# Patient Record
Sex: FEMALE | Race: WHITE | NOT HISPANIC OR LATINO | Employment: FULL TIME | URBAN - METROPOLITAN AREA
[De-identification: names, ages, dates, MRNs, and addresses within clinical notes are randomized per-mention and may not be internally consistent; named-entity substitution may affect disease eponyms.]

---

## 2018-01-03 ENCOUNTER — TELEPHONE (OUTPATIENT)
Dept: GASTROENTEROLOGY | Facility: CLINIC | Age: 43
End: 2018-01-03

## 2018-01-03 NOTE — TELEPHONE ENCOUNTER
----- Message from Rivera Ibarra sent at 1/3/2018  2:26 PM EST -----  Regarding: PT CALLED  Contact: [phone]   PT IS CALLING TO SEE IF SHE CAN GET A REFILL ON sulfaSALAzine (AZULFIDINE) 500 MG tablet , SAYS HER BOWELS ARE ALL FLARED UP & THIS MED REALLY HELPED.    PT WOULD LIKE A CALL BACK AS WELL.  PHARM#[phone]

## 2018-01-04 ENCOUNTER — OFFICE VISIT (OUTPATIENT)
Dept: GASTROENTEROLOGY | Facility: CLINIC | Age: 43
End: 2018-01-04

## 2018-01-04 VITALS
HEIGHT: 59 IN | DIASTOLIC BLOOD PRESSURE: 70 MMHG | TEMPERATURE: 98.6 F | BODY MASS INDEX: 35.56 KG/M2 | SYSTOLIC BLOOD PRESSURE: 122 MMHG | WEIGHT: 176.4 LBS

## 2018-01-04 DIAGNOSIS — K51.011 ULCERATIVE PANCOLITIS WITH RECTAL BLEEDING (HCC): ICD-10-CM

## 2018-01-04 DIAGNOSIS — K62.5 RECTAL BLEEDING: ICD-10-CM

## 2018-01-04 DIAGNOSIS — R19.7 DIARRHEA, UNSPECIFIED TYPE: Primary | ICD-10-CM

## 2018-01-04 PROCEDURE — 99214 OFFICE O/P EST MOD 30 MIN: CPT | Performed by: INTERNAL MEDICINE

## 2018-01-04 RX ORDER — CETIRIZINE HYDROCHLORIDE 10 MG/1
10 TABLET ORAL DAILY
COMMUNITY
End: 2020-10-30

## 2018-01-04 RX ORDER — SULFASALAZINE 500 MG/1
TABLET ORAL
Qty: 180 TABLET | Refills: 11 | Status: SHIPPED | OUTPATIENT
Start: 2018-01-04 | End: 2018-03-12 | Stop reason: SDUPTHER

## 2018-01-04 RX ORDER — VENLAFAXINE HYDROCHLORIDE 75 MG/1
CAPSULE, EXTENDED RELEASE ORAL
Refills: 0 | COMMUNITY
Start: 2017-12-29 | End: 2018-09-10 | Stop reason: SINTOL

## 2018-01-04 RX ORDER — SPIRONOLACTONE 25 MG/1
TABLET ORAL
Refills: 0 | COMMUNITY
Start: 2017-12-29

## 2018-01-04 NOTE — PROGRESS NOTES
Chief Complaint   Patient presents with   • Abdominal Pain   • Rectal Bleeding   • Ulcerative Colitis       History of Present Illness: 43 yo female  with a h/o ulcerative colitis diagnosed in . She is having rectal bleeding, mucous, diarrhea: she could have 10-50 BM/day. She has been flairing for 4 prachi months. One 10 yo son. She has mild lower abdominal pain that is worse prior to BM and better after BM. No nausea or vomting. NO fevers, chills. Weight stable. She is taking nothing for UC but the Sulfasalazine worked in the past. No foreign travel. Last antibiotics: long time.     Past Medical History:   Diagnosis Date   • Ulcerative colitis        Past Surgical History:   Procedure Laterality Date   • BREAST AUGMENTATION     •  SECTION     • COLONOSCOPY  2015    left sided ulcerative colitis   • UPPER GASTROINTESTINAL ENDOSCOPY  2015    gastritis   • WISDOM TOOTH EXTRACTION           Current Outpatient Prescriptions:   •  cetirizine (zyrTEC) 10 MG tablet, Take 10 mg by mouth Daily., Disp: , Rfl:   •  Prenatal Vit-Fe Fumarate-FA (PRENATAL VITAMIN PO), Take  by mouth., Disp: , Rfl:   •  PROBIOTIC PRODUCT PO, Take  by mouth., Disp: , Rfl:   •  spironolactone (ALDACTONE) 25 MG tablet, TK 1 TO 2 TS PO D, Disp: , Rfl: 0  •  venlafaxine XR (EFFEXOR-XR) 75 MG 24 hr capsule, TK 1 C PO QAM, Disp: , Rfl: 0  •  sulfaSALAzine (AZULFIDINE) 500 MG tablet, Take 3 tablets by mouth twice a day, Disp: 180 tablet, Rfl: 11    No Known Allergies    Family History   Problem Relation Age of Onset   • Pancreatic cancer Maternal Grandmother        Social History     Social History   • Marital status:      Spouse name: N/A   • Number of children: N/A   • Years of education: N/A     Occupational History   • Not on file.     Social History Main Topics   • Smoking status: Never Smoker   • Smokeless tobacco: Never Used   • Alcohol use Yes      Comment: seldom   • Drug use: No   • Sexual activity: Not on file      Other Topics Concern   • Not on file     Social History Narrative   • No narrative on file       Review of Systems   Gastrointestinal: Positive for anal bleeding and diarrhea.   All other systems reviewed and are negative.      Vitals:    01/04/18 1028   BP: 122/70   Temp: 98.6 °F (37 °C)       Physical Exam   Constitutional: She is oriented to person, place, and time. She appears well-developed and well-nourished. No distress.   HENT:   Head: Normocephalic and atraumatic. Hair is normal.   Right Ear: Hearing, tympanic membrane, external ear and ear canal normal. No drainage. No decreased hearing is noted.   Left Ear: Hearing, tympanic membrane, external ear and ear canal normal. No decreased hearing is noted.   Nose: No nasal deformity.   Mouth/Throat: Oropharynx is clear and moist.   Eyes: Conjunctivae, EOM and lids are normal. Pupils are equal, round, and reactive to light. Right eye exhibits no discharge. Left eye exhibits no discharge.   Neck: Normal range of motion. Neck supple. No JVD present. No tracheal deviation present. No thyromegaly present.   Cardiovascular: Normal rate, regular rhythm, normal heart sounds, intact distal pulses and normal pulses.  Exam reveals no gallop and no friction rub.    No murmur heard.  Pulmonary/Chest: Effort normal and breath sounds normal. No respiratory distress. She has no wheezes. She has no rales. She exhibits no tenderness.   Abdominal: Soft. Bowel sounds are normal. She exhibits no distension and no mass. There is no tenderness. There is no rebound and no guarding. No hernia.   Genitourinary: Rectal exam shows guaiac positive stool.   Genitourinary Comments: Normal anorectal exam.   Musculoskeletal: Normal range of motion. She exhibits no edema, tenderness or deformity.   Lymphadenopathy:     She has no cervical adenopathy.   Neurological: She is alert and oriented to person, place, and time. She has normal reflexes. She displays normal reflexes. No cranial nerve  deficit. She exhibits normal muscle tone. Coordination normal.   Skin: Skin is warm and dry. No rash noted. She is not diaphoretic. No erythema.   Psychiatric: She has a normal mood and affect. Her behavior is normal. Judgment and thought content normal.   Vitals reviewed.      Marah was seen today for abdominal pain, rectal bleeding and ulcerative colitis.    Diagnoses and all orders for this visit:    Diarrhea, unspecified type  -     C-reactive Protein  -     Sedimentation Rate  -     Calprotectin, Fecal - Stool, Per Rectum  -     CBC & Differential  -     Comprehensive Metabolic Panel  -     Clostridium Difficile EIA - Stool, Per Rectum  -     Fecal Leukocytes - Stool, Per Rectum  -     Giardia Antigen - Stool, Per Rectum  -     Ova & Parasite Examination - Stool, Per Rectum  -     Stool Culture - Stool, Per Rectum  -     Celiac Ab tTG DGP TIgA  -     TSH    Rectal bleeding  -     C-reactive Protein  -     Sedimentation Rate  -     Calprotectin, Fecal - Stool, Per Rectum  -     CBC & Differential  -     Comprehensive Metabolic Panel  -     Clostridium Difficile EIA - Stool, Per Rectum  -     Fecal Leukocytes - Stool, Per Rectum  -     Giardia Antigen - Stool, Per Rectum  -     Ova & Parasite Examination - Stool, Per Rectum  -     Stool Culture - Stool, Per Rectum  -     TSH    Ulcerative pancolitis with rectal bleeding  -     C-reactive Protein  -     Sedimentation Rate  -     Calprotectin, Fecal - Stool, Per Rectum  -     CBC & Differential  -     Comprehensive Metabolic Panel  -     Clostridium Difficile EIA - Stool, Per Rectum  -     Fecal Leukocytes - Stool, Per Rectum  -     Giardia Antigen - Stool, Per Rectum  -     Ova & Parasite Examination - Stool, Per Rectum  -     Stool Culture - Stool, Per Rectum  -     sulfaSALAzine (AZULFIDINE) 500 MG tablet; Take 3 tablets by mouth twice a day  -     TSH      Assessment:  1) h/o ulcerative colitis diagnosed in 2009.  2) Bloody diarrhea with heme positive stool on  rectal exam today.    Recommendations:  1) Stool studies  2) Labs:  3) Sulfasalazine 3 BID.  4) f/u 4 weeks.     No Follow-up on file.    David Capellan MD  1/4/2018

## 2018-01-04 NOTE — TELEPHONE ENCOUNTER
Called pt and pt reports that she is currently having very bloody and mucusy stools with severe urgency.  She would like to be seen as soon as she can.  Offered pt an appt today at 10 am with Dr Capellan.  Pt verb understanding and can make the appt.

## 2018-01-05 LAB
ALBUMIN SERPL-MCNC: 4.2 G/DL (ref 3.5–5.2)
ALBUMIN/GLOB SERPL: 1.2 G/DL
ALP SERPL-CCNC: 105 U/L (ref 39–117)
ALT SERPL-CCNC: 21 U/L (ref 1–33)
AST SERPL-CCNC: 19 U/L (ref 1–32)
BASOPHILS # BLD AUTO: 0.02 10*3/MM3 (ref 0–0.2)
BASOPHILS NFR BLD AUTO: 0.2 % (ref 0–1.5)
BILIRUB SERPL-MCNC: 0.4 MG/DL (ref 0.1–1.2)
BUN SERPL-MCNC: 15 MG/DL (ref 6–20)
BUN/CREAT SERPL: 20 (ref 7–25)
CALCIUM SERPL-MCNC: 9.3 MG/DL (ref 8.6–10.5)
CHLORIDE SERPL-SCNC: 101 MMOL/L (ref 98–107)
CO2 SERPL-SCNC: 25.3 MMOL/L (ref 22–29)
CREAT SERPL-MCNC: 0.75 MG/DL (ref 0.57–1)
CRP SERPL-MCNC: 0.66 MG/DL (ref 0–0.5)
EOSINOPHIL # BLD AUTO: 0.12 10*3/MM3 (ref 0–0.7)
EOSINOPHIL NFR BLD AUTO: 1.3 % (ref 0.3–6.2)
ERYTHROCYTE [DISTWIDTH] IN BLOOD BY AUTOMATED COUNT: 13.2 % (ref 11.7–13)
ERYTHROCYTE [SEDIMENTATION RATE] IN BLOOD BY WESTERGREN METHOD: 16 MM/HR (ref 0–20)
GLIADIN PEPTIDE IGA SER-ACNC: 4 UNITS (ref 0–19)
GLIADIN PEPTIDE IGG SER-ACNC: 7 UNITS (ref 0–19)
GLOBULIN SER CALC-MCNC: 3.4 GM/DL
GLUCOSE SERPL-MCNC: 89 MG/DL (ref 65–99)
HCT VFR BLD AUTO: 42.9 % (ref 35.6–45.5)
HGB BLD-MCNC: 13.3 G/DL (ref 11.9–15.5)
IGA SERPL-MCNC: 293 MG/DL (ref 87–352)
IMM GRANULOCYTES # BLD: 0.03 10*3/MM3 (ref 0–0.03)
IMM GRANULOCYTES NFR BLD: 0.3 % (ref 0–0.5)
LYMPHOCYTES # BLD AUTO: 3.08 10*3/MM3 (ref 0.9–4.8)
LYMPHOCYTES NFR BLD AUTO: 32.3 % (ref 19.6–45.3)
MCH RBC QN AUTO: 29.6 PG (ref 26.9–32)
MCHC RBC AUTO-ENTMCNC: 31 G/DL (ref 32.4–36.3)
MCV RBC AUTO: 95.3 FL (ref 80.5–98.2)
MONOCYTES # BLD AUTO: 0.66 10*3/MM3 (ref 0.2–1.2)
MONOCYTES NFR BLD AUTO: 6.9 % (ref 5–12)
NEUTROPHILS # BLD AUTO: 5.62 10*3/MM3 (ref 1.9–8.1)
NEUTROPHILS NFR BLD AUTO: 59 % (ref 42.7–76)
PLATELET # BLD AUTO: 374 10*3/MM3 (ref 140–500)
POTASSIUM SERPL-SCNC: 4.8 MMOL/L (ref 3.5–5.2)
PROT SERPL-MCNC: 7.6 G/DL (ref 6–8.5)
RBC # BLD AUTO: 4.5 10*6/MM3 (ref 3.9–5.2)
SODIUM SERPL-SCNC: 138 MMOL/L (ref 136–145)
TSH SERPL DL<=0.005 MIU/L-ACNC: 1.78 MIU/ML (ref 0.27–4.2)
TTG IGA SER-ACNC: <2 U/ML (ref 0–3)
TTG IGG SER-ACNC: <2 U/ML (ref 0–5)
WBC # BLD AUTO: 9.53 10*3/MM3 (ref 4.5–10.7)

## 2018-01-07 NOTE — PROGRESS NOTES
MOst of her labs look good. Her CBC is normal. Celiac sprue antibody panel, CMP and thyroid stimulating hormone all normal.   I will be curious what the stool studies show.

## 2018-01-08 ENCOUNTER — TELEPHONE (OUTPATIENT)
Dept: GASTROENTEROLOGY | Facility: CLINIC | Age: 43
End: 2018-01-08

## 2018-01-08 NOTE — TELEPHONE ENCOUNTER
----- Message from David Capellan MD sent at 1/7/2018  3:22 PM EST -----  MOst of her labs look good. Her CBC is normal. Celiac sprue antibody panel, CMP and thyroid stimulating hormone all normal.   I will be curious what the stool studies show.

## 2018-01-08 NOTE — TELEPHONE ENCOUNTER
Call from pt.  Advise per Dr Masterson that most of labs look good.  CBC, celiac panel, CMP, and TSH all normal.  Dr Capellan will be curious what the stool studies show.  Pt verb understanding and states is bring stool specimens to office now.

## 2018-01-15 ENCOUNTER — OFFICE VISIT (OUTPATIENT)
Dept: OBSTETRICS AND GYNECOLOGY | Facility: CLINIC | Age: 43
End: 2018-01-15

## 2018-01-15 VITALS
BODY MASS INDEX: 40.73 KG/M2 | SYSTOLIC BLOOD PRESSURE: 104 MMHG | WEIGHT: 176 LBS | HEIGHT: 55 IN | DIASTOLIC BLOOD PRESSURE: 62 MMHG

## 2018-01-15 DIAGNOSIS — Z01.419 WELL WOMAN EXAM WITH ROUTINE GYNECOLOGICAL EXAM: Primary | ICD-10-CM

## 2018-01-15 PROCEDURE — 99386 PREV VISIT NEW AGE 40-64: CPT | Performed by: NURSE PRACTITIONER

## 2018-01-15 NOTE — PROGRESS NOTES
[facility]  Routine Annual Visit    1/15/2018    Patient: Marah Britt          MR#:[id number]      History of Present Illness    42 y.o. female  who presents to [facility] care and for annual exam. She has never had a mammogram. She will have her first today. She reports last pap 3-4 years ago. She denies any hx of abnormals. She has had 1 pregnancy and 1 delivery via  section in  by Dr. Dickson. Marah reports normal, monthly cycles every 28 days lasting about 4 days with light bleeding and mild cramping. Medical hx significant for UC- managed by Dr. Capellan. She has not been sexually active since becoming a . She has no plans to have IC and declines birth control. Marah sees primary care and is currently working on weight reduction. Her 9 year old son is doing very well in school and extracurricular activities. Marah has no complaints today.     Patient's last menstrual period was 2018.  Obstetric History:  OB History      Para Term  AB Living    1         SAB TAB Ectopic Multiple Live Births        1         Menstrual History:     Patient's last menstrual period was 2018.       Sexual History:       ________________________________________  There is no problem list on file for this patient.      Past Medical History:   Diagnosis Date   • Ulcerative colitis        Past Surgical History:   Procedure Laterality Date   • BREAST AUGMENTATION     •  SECTION     • COLONOSCOPY  2015    left sided ulcerative colitis   • UPPER GASTROINTESTINAL ENDOSCOPY  2015    gastritis   • WISDOM TOOTH EXTRACTION         History   Smoking Status   • Never Smoker   Smokeless Tobacco   • Never Used       Family History   Problem Relation Age of Onset   • Pancreatic cancer Maternal Grandmother        Prior to Admission medications    Medication Sig Start Date End Date Taking? Authorizing Provider   cetirizine (zyrTEC) 10 MG tablet Take 10 mg by mouth Daily.    Yes Historical Provider, MD   Prenatal Vit-Fe Fumarate-FA (PRENATAL VITAMIN PO) Take  by mouth.   Yes Historical Provider, MD   PROBIOTIC PRODUCT PO Take  by mouth.   Yes Historical Provider, MD   spironolactone (ALDACTONE) 25 MG tablet TK 1 TO 2 TS PO D 12/29/17  Yes Historical Provider, MD   sulfaSALAzine (AZULFIDINE) 500 MG tablet Take 3 tablets by mouth twice a day 1/4/18  Yes David Capellan MD   venlafaxine XR (EFFEXOR-XR) 75 MG 24 hr capsule TK 1 C PO QAM 12/29/17  Yes Historical Provider, MD     ________________________________________    Current contraception: abstinence  History of abnormal Pap smear: no  Family history of uterine or ovarian cancer: no  Family History of colon cancer/colon polyps: no  History of abnormal mammogram: no  History of abnormal lipids: no    The following portions of the patient's history were reviewed and updated as appropriate: allergies, current medications, past family history, past medical history, past social history, past surgical history and problem list.    Review of Systems   Constitutional: Negative.    HENT: Negative.    Eyes: Negative for visual disturbance.   Respiratory: Negative for cough, shortness of breath and wheezing.    Cardiovascular: Negative for chest pain, palpitations and leg swelling.   Gastrointestinal: Negative for abdominal distention, abdominal pain, blood in stool, constipation, diarrhea, nausea and vomiting.   Endocrine: Negative for cold intolerance and heat intolerance.   Genitourinary: Negative for difficulty urinating, dyspareunia, dysuria, frequency, genital sores, hematuria, menstrual problem, pelvic pain, urgency, vaginal bleeding, vaginal discharge and vaginal pain.   Musculoskeletal: Negative.    Skin: Negative.    Neurological: Negative for dizziness, weakness, light-headedness, numbness and headaches.   Hematological: Negative.    Psychiatric/Behavioral: Negative.    Breasts: negative for lumps skin changes, dimpling, swelling, nipple  "changes/discharge bilaterally       Objective   Physical Exam    /62  Ht 59 cm (23.23\")  Wt 79.8 kg (176 lb)  LMP 01/14/2018  Breastfeeding? No  .35 kg/m2   BP Readings from Last 3 Encounters:   01/15/18 104/62   01/04/18 122/70      Wt Readings from Last 3 Encounters:   01/15/18 79.8 kg (176 lb)   01/04/18 80 kg (176 lb 6.4 oz)        BMI: Estimated body mass index is 229.35 kg/(m^2) as calculated from the following:    Height as of this encounter: 59 cm (23.23\").    Weight as of this encounter: 79.8 kg (176 lb).        General:   alert, appears stated age and cooperative   Heart: regular rate and rhythm, S1, S2 normal, no murmur, click, rub or gallop   Lungs: clear to auscultation bilaterally   Abdomen: soft, non-tender, without masses or organomegaly   Breast: inspection negative, no nipple discharge or bleeding, no masses or nodularity palpable   Vulva: normal   Vagina: normal mucosa, normal discharge   Cervix: no bleeding following Pap, no cervical motion tenderness and no lesions   Uterus: normal size, mobile, non-tender or normal shape and consistency   Adnexa: exam limited by habitus     Assessment:    1. Normal annual exam   2. Healthy lifestyle modifications discussed, counseled on self breast exams and bone health      Plan:    []  Rx:   [x]  Mammogram  [x]  PAP done  []  Occult fecal blood test ([facility])  []  Labs:   []  GC/Chl/TV  []  DEXA scan   []  Referral for colonoscopy:           MELISA Ruggiero  1/15/2018 2:28 PM  "

## 2018-01-16 LAB
BACTERIA SPEC CULT: NORMAL
BACTERIA SPEC CULT: NORMAL
C DIFF TOX A+B STL QL IA: NEGATIVE
CALPROTECTIN STL-MCNT: 354 UG/G (ref 0–120)
CAMPYLOBACTER STL CULT: NORMAL
E COLI SXT STL QL IA: NEGATIVE
G LAMBLIA AG STL QL IA: NEGATIVE
O+P SPEC MICRO: NORMAL
O+P STL CONC: NORMAL
SALM + SHIG STL CULT: NORMAL
WBC STL QL MICRO: NORMAL
WBC STL QL MICRO: NORMAL

## 2018-01-17 ENCOUNTER — TELEPHONE (OUTPATIENT)
Dept: GASTROENTEROLOGY | Facility: CLINIC | Age: 43
End: 2018-01-17

## 2018-01-17 NOTE — PROGRESS NOTES
Tell her that her stool studies are mostly normal. Her stool Calproctectin came back high suggesting that her diarrhea is from a flair of ulcerative colitis. I hope she is better while on Sulfasalazine. Ask her if she is tolerating the sulfasalazine and is her diarrhea better since on the Sulfasalazine? juan luis

## 2018-01-17 NOTE — TELEPHONE ENCOUNTER
"Call returned to pt.  Advise per DR Capellan that stool studies are mostly normal.  Stool Calprotectin came back high suggesting that diarrhea is from a flair of UC.  Hope that better while on Sulfasalazine.    Pt verb understanding.  States doing \"a whole lot better\".  Denies blood or diarrhea.  Goes about 4x/day - passes mucous and formed stool.  Denies abd pain, cramping, fever.  States has f/u appt on 2/12 - asking if gets back to normal, would she still need to keep that appt.    Update and question to Dr Capellan.  "

## 2018-01-18 LAB
CYTOLOGIST CVX/VAG CYTO: NORMAL
CYTOLOGY CVX/VAG DOC THIN PREP: NORMAL
DX ICD CODE: NORMAL
HIV 1 & 2 AB SER-IMP: NORMAL
HPV I/H RISK 4 DNA CVX QL PROBE+SIG AMP: NEGATIVE
OTHER STN SPEC: NORMAL
PATH REPORT.FINAL DX SPEC: NORMAL
STAT OF ADQ CVX/VAG CYTO-IMP: NORMAL

## 2018-01-19 ENCOUNTER — TELEPHONE (OUTPATIENT)
Dept: OBSTETRICS AND GYNECOLOGY | Age: 43
End: 2018-01-19

## 2018-01-19 NOTE — TELEPHONE ENCOUNTER
----- Message from MELISA Rich sent at 1/19/2018  1:04 PM EST -----  Please inform patient her pap smear returned negative (normal) and her mammogram was also negative. Repeat 1 year. Thank you.

## 2018-01-22 ENCOUNTER — TELEPHONE (OUTPATIENT)
Dept: OBSTETRICS AND GYNECOLOGY | Age: 43
End: 2018-01-22

## 2018-03-12 ENCOUNTER — OFFICE VISIT (OUTPATIENT)
Dept: GASTROENTEROLOGY | Facility: CLINIC | Age: 43
End: 2018-03-12

## 2018-03-12 VITALS
WEIGHT: 175 LBS | TEMPERATURE: 98.3 F | SYSTOLIC BLOOD PRESSURE: 128 MMHG | BODY MASS INDEX: 34.36 KG/M2 | DIASTOLIC BLOOD PRESSURE: 74 MMHG | HEIGHT: 60 IN

## 2018-03-12 DIAGNOSIS — K62.5 RECTAL BLEEDING: ICD-10-CM

## 2018-03-12 DIAGNOSIS — K51.90 ULCERATIVE COLITIS WITHOUT COMPLICATIONS, UNSPECIFIED LOCATION (HCC): ICD-10-CM

## 2018-03-12 DIAGNOSIS — R10.84 GENERALIZED ABDOMINAL PAIN: Primary | ICD-10-CM

## 2018-03-12 PROCEDURE — 99214 OFFICE O/P EST MOD 30 MIN: CPT | Performed by: NURSE PRACTITIONER

## 2018-03-12 RX ORDER — DEXTROAMPHETAMINE SACCHARATE, AMPHETAMINE ASPARTATE, DEXTROAMPHETAMINE SULFATE AND AMPHETAMINE SULFATE 1.25; 1.25; 1.25; 1.25 MG/1; MG/1; MG/1; MG/1
5 TABLET ORAL
COMMUNITY
Start: 2018-03-09 | End: 2020-10-30

## 2018-03-12 RX ORDER — SULFASALAZINE 500 MG/1
TABLET ORAL
Qty: 180 TABLET | Refills: 11 | Status: ON HOLD | OUTPATIENT
Start: 2018-03-12 | End: 2018-10-22

## 2018-03-12 NOTE — PROGRESS NOTES
Chief Complaint   Patient presents with   • Ulcerative Colitis       Marah Britt is a  42 y.o. female here for a follow up visit for abdominal pain, rectal bleeding and hx ulcerative colitis.   HPI  43 yo f presents today for follow up for abdominal pain, rectal bleeding and hx ulcerative colitis. She is a patient of Dr. Capellan. She was last seen in the office with Dr. Capellan on 18. At that time patient was having UC flare symptoms and was started back on Sulfasalazine 500 mg 3 tabs BID. She admits since taking it she is feeling so much better. She denies any abd pain, N&V, diarrhea, constipation, rectal bleeding or melena. She is also taking a daily probiotic. Last colonoscopy with Dr. Capellan was 6/16/15 and showed left sided ulcerative colitis. Last EGD done 2015 showed gastritis. She is due for another surveillance colonoscopy 2018. She denies any other GI issues today. She admits her appetite is good and her weight is stable.   Past Medical History:   Diagnosis Date   • ADHD    • Ulcerative colitis        Past Surgical History:   Procedure Laterality Date   • BREAST AUGMENTATION     •  SECTION     • COLONOSCOPY  2015    left sided ulcerative colitis   • COLONOSCOPY  2011    Probable ulcerative proctitis? with some inflammation around the appendiceal orifice   • UPPER GASTROINTESTINAL ENDOSCOPY  2015    gastritis   • WISDOM TOOTH EXTRACTION         Scheduled Meds:    Continuous Infusions:  No current facility-administered medications for this visit.     PRN Meds:.    No Known Allergies    Social History     Social History   • Marital status:      Spouse name: N/A   • Number of children: N/A   • Years of education: N/A     Occupational History   • Not on file.     Social History Main Topics   • Smoking status: Never Smoker   • Smokeless tobacco: Never Used   • Alcohol use Yes      Comment: seldom   • Drug use: No   • Sexual activity: Not on file     Other Topics Concern    • Not on file     Social History Narrative   • No narrative on file       Family History   Problem Relation Age of Onset   • Pancreatic cancer Maternal Grandmother        Review of Systems   Constitutional: Negative for appetite change, chills, diaphoresis, fatigue, fever and unexpected weight change.   HENT: Negative for nosebleeds, postnasal drip, sore throat, trouble swallowing and voice change.    Respiratory: Negative for cough, choking, chest tightness, shortness of breath and wheezing.    Cardiovascular: Negative for chest pain.   Gastrointestinal: Negative for abdominal distention, abdominal pain, anal bleeding, blood in stool, constipation, diarrhea, nausea and vomiting.   Endocrine: Negative for polydipsia, polyphagia and polyuria.   Musculoskeletal: Negative for gait problem.   Skin: Negative for rash and wound.   Allergic/Immunologic: Negative for food allergies.   Neurological: Negative for dizziness, speech difficulty and light-headedness.   Psychiatric/Behavioral: Negative for confusion, self-injury, sleep disturbance and suicidal ideas.       Vitals:    03/12/18 0841   BP: 128/74   Temp: 98.3 °F (36.8 °C)       Physical Exam   Constitutional: She is oriented to person, place, and time. She appears well-developed and well-nourished. She does not appear ill. No distress.   HENT:   Head: Normocephalic.   Eyes: Pupils are equal, round, and reactive to light.   Cardiovascular: Normal rate, regular rhythm and normal heart sounds.    Pulmonary/Chest: Effort normal and breath sounds normal.   Abdominal: Soft. Bowel sounds are normal. She exhibits no distension and no mass. There is no hepatosplenomegaly. There is no tenderness. There is no rebound and no guarding. No hernia.   Musculoskeletal: Normal range of motion.   Neurological: She is alert and oriented to person, place, and time.   Skin: Skin is warm and dry.   Psychiatric: She has a normal mood and affect. Her speech is normal and behavior is  normal. Judgment normal.       No images are attached to the encounter.    Assessment & Plan    1. Generalized abdominal pain    2. Rectal bleeding    3. Ulcerative colitis without complications, unspecified location  - sulfaSALAzine (AZULFIDINE) 500 MG tablet; Take 3 tablets by mouth twice a day  Dispense: 180 tablet; Refill: 11    UC seems to be stable now on the sulfasalazine. She will need to stay on it now. I will give refills. She will need to schedule colonoscopy with Dr. aCpellan in another month or two for surveillance. Follow up with Dr. Capellan in 6 months or sooner as needed.

## 2018-09-10 ENCOUNTER — OFFICE VISIT (OUTPATIENT)
Dept: GASTROENTEROLOGY | Facility: CLINIC | Age: 43
End: 2018-09-10

## 2018-09-10 VITALS
BODY MASS INDEX: 33.81 KG/M2 | HEIGHT: 60 IN | SYSTOLIC BLOOD PRESSURE: 118 MMHG | WEIGHT: 172.2 LBS | DIASTOLIC BLOOD PRESSURE: 82 MMHG | TEMPERATURE: 99 F

## 2018-09-10 DIAGNOSIS — K51.00 ULCERATIVE PANCOLITIS WITHOUT COMPLICATION (HCC): Primary | ICD-10-CM

## 2018-09-10 PROCEDURE — 99214 OFFICE O/P EST MOD 30 MIN: CPT | Performed by: INTERNAL MEDICINE

## 2018-09-10 NOTE — PROGRESS NOTES
Chief Complaint   Patient presents with   • Follow-up   • Abdominal Pain       History of Present Illness: 42 yo female with h/o ulcerative colitis diagnosed in . On sulfasalazine 3 BID. She sometimes forgets the evening dose. NO diarrhea or constipation. No rectal bleeding or melena. NO abdominal or chest pain. Weight stable.     Past Medical History:   Diagnosis Date   • ADHD    • Ulcerative colitis (CMS/HCC)        Past Surgical History:   Procedure Laterality Date   • BREAST AUGMENTATION     •  SECTION     • COLONOSCOPY  2015    left sided ulcerative colitis   • COLONOSCOPY  2011    Probable ulcerative proctitis? with some inflammation around the appendiceal orifice   • UPPER GASTROINTESTINAL ENDOSCOPY  2015    gastritis   • WISDOM TOOTH EXTRACTION           Current Outpatient Prescriptions:   •  amphetamine-dextroamphetamine (ADDERALL) 5 MG tablet, Take 5 mg by mouth., Disp: , Rfl:   •  Prenatal Vit-Fe Fumarate-FA (PRENATAL VITAMIN PO), Take  by mouth., Disp: , Rfl:   •  PROBIOTIC PRODUCT PO, Take  by mouth., Disp: , Rfl:   •  spironolactone (ALDACTONE) 25 MG tablet, TK 1 TO 2 TS PO D, Disp: , Rfl: 0  •  sulfaSALAzine (AZULFIDINE) 500 MG tablet, Take 3 tablets by mouth twice a day, Disp: 180 tablet, Rfl: 11  •  cetirizine (zyrTEC) 10 MG tablet, Take 10 mg by mouth Daily., Disp: , Rfl:     No Known Allergies    Family History   Problem Relation Age of Onset   • Pancreatic cancer Maternal Grandmother        Social History     Social History   • Marital status:      Spouse name: N/A   • Number of children: N/A   • Years of education: N/A     Occupational History   • Not on file.     Social History Main Topics   • Smoking status: Never Smoker   • Smokeless tobacco: Never Used   • Alcohol use Yes      Comment: seldom   • Drug use: No   • Sexual activity: Not on file     Other Topics Concern   • Not on file     Social History Narrative   • No narrative on file       Review of  Systems   All other systems reviewed and are negative.      Vitals:    09/10/18 0835   BP: 118/82   Temp: 99 °F (37.2 °C)       Physical Exam   Constitutional: She is oriented to person, place, and time. She appears well-developed and well-nourished. No distress.   HENT:   Head: Normocephalic and atraumatic. Hair is normal.   Right Ear: Hearing, tympanic membrane, external ear and ear canal normal. No drainage. No decreased hearing is noted.   Left Ear: Hearing, tympanic membrane, external ear and ear canal normal. No decreased hearing is noted.   Nose: No nasal deformity.   Mouth/Throat: Oropharynx is clear and moist.   Eyes: Pupils are equal, round, and reactive to light. Conjunctivae, EOM and lids are normal. Right eye exhibits no discharge. Left eye exhibits no discharge.   Neck: Normal range of motion. Neck supple. No JVD present. No tracheal deviation present. No thyromegaly present.   Cardiovascular: Normal rate, regular rhythm, normal heart sounds, intact distal pulses and normal pulses.  Exam reveals no gallop and no friction rub.    No murmur heard.  Pulmonary/Chest: Effort normal and breath sounds normal. No respiratory distress. She has no wheezes. She has no rales. She exhibits no tenderness.   Abdominal: Soft. Bowel sounds are normal. She exhibits no distension and no mass. There is no tenderness. There is no rebound and no guarding. No hernia.   Musculoskeletal: Normal range of motion. She exhibits no edema, tenderness or deformity.   Lymphadenopathy:     She has no cervical adenopathy.   Neurological: She is alert and oriented to person, place, and time. She has normal reflexes. She displays normal reflexes. No cranial nerve deficit. She exhibits normal muscle tone. Coordination normal.   Skin: Skin is warm and dry. No rash noted. She is not diaphoretic. No erythema.   Psychiatric: She has a normal mood and affect. Her behavior is normal. Judgment and thought content normal.   Vitals  reviewed.      Marah was seen today for follow-up and abdominal pain.    Diagnoses and all orders for this visit:    Ulcerative pancolitis without complication (CMS/[facility])  -     Case Request; Standing  -     Case Request    Other orders  -     Follow Anesthesia Guidelines / Standing Orders; Future  -     Implement Anesthesia orders day of procedure.; Standing  -     Obtain informed consent; Standing  -     Verify bowel prep was successful; Standing  -     Give tap water enema if bowel prep was insufficient; Standing      Assessment:  1) h/o UC diagnosed in 2009.    Recommendations:  1) She will try to get Apriso 4/day instead of sulfasalazine  2) Colonoscopy    No Follow-up on file.    David Capellan MD  9/10/2018

## 2018-10-22 ENCOUNTER — HOSPITAL ENCOUNTER (OUTPATIENT)
Facility: HOSPITAL | Age: 43
Setting detail: HOSPITAL OUTPATIENT SURGERY
Discharge: HOME | End: 2018-10-22
Attending: INTERNAL MEDICINE | Admitting: INTERNAL MEDICINE

## 2018-10-22 ENCOUNTER — ANESTHESIA EVENT (OUTPATIENT)
Dept: GASTROENTEROLOGY | Facility: HOSPITAL | Age: 43
End: 2018-10-22

## 2018-10-22 ENCOUNTER — ANESTHESIA (OUTPATIENT)
Dept: GASTROENTEROLOGY | Facility: HOSPITAL | Age: 43
End: 2018-10-22

## 2018-10-22 VITALS
SYSTOLIC BLOOD PRESSURE: 105 MMHG | DIASTOLIC BLOOD PRESSURE: 78 MMHG | RESPIRATION RATE: 16 BRPM | TEMPERATURE: 98.1 F | OXYGEN SATURATION: 100 % | HEART RATE: 89 BPM | BODY MASS INDEX: 31.66 KG/M2 | HEIGHT: 60 IN | WEIGHT: 161.25 LBS

## 2018-10-22 DIAGNOSIS — K51.00 ULCERATIVE PANCOLITIS WITHOUT COMPLICATION (HCC): ICD-10-CM

## 2018-10-22 LAB
ALBUMIN SERPL-MCNC: 4.3 G/DL (ref 3.5–5.2)
ALBUMIN/GLOB SERPL: 1.3 G/DL
ALP SERPL-CCNC: 68 U/L (ref 39–117)
ALT SERPL W P-5'-P-CCNC: 9 U/L (ref 1–33)
ANION GAP SERPL CALCULATED.3IONS-SCNC: 14.9 MMOL/L
AST SERPL-CCNC: 12 U/L (ref 1–32)
B-HCG UR QL: NEGATIVE
BASOPHILS # BLD AUTO: 0.02 10*3/MM3 (ref 0–0.2)
BASOPHILS NFR BLD AUTO: 0.2 % (ref 0–1.5)
BILIRUB SERPL-MCNC: 0.5 MG/DL (ref 0.1–1.2)
BUN BLD-MCNC: 9 MG/DL (ref 6–20)
BUN/CREAT SERPL: 12.9 (ref 7–25)
CALCIUM SPEC-SCNC: 9.4 MG/DL (ref 8.6–10.5)
CHLORIDE SERPL-SCNC: 103 MMOL/L (ref 98–107)
CO2 SERPL-SCNC: 21.1 MMOL/L (ref 22–29)
CREAT BLD-MCNC: 0.7 MG/DL (ref 0.57–1)
DEPRECATED RDW RBC AUTO: 44.5 FL (ref 37–54)
EOSINOPHIL # BLD AUTO: 0.04 10*3/MM3 (ref 0–0.7)
EOSINOPHIL NFR BLD AUTO: 0.4 % (ref 0.3–6.2)
ERYTHROCYTE [DISTWIDTH] IN BLOOD BY AUTOMATED COUNT: 12.8 % (ref 11.7–13)
GFR SERPL CREATININE-BSD FRML MDRD: 91 ML/MIN/1.73
GLOBULIN UR ELPH-MCNC: 3.2 GM/DL
GLUCOSE BLD-MCNC: 71 MG/DL (ref 65–99)
HCT VFR BLD AUTO: 41.4 % (ref 35.6–45.5)
HGB BLD-MCNC: 13.2 G/DL (ref 11.9–15.5)
IMM GRANULOCYTES # BLD: 0.03 10*3/MM3 (ref 0–0.03)
IMM GRANULOCYTES NFR BLD: 0.3 % (ref 0–0.5)
INTERNAL NEGATIVE CONTROL: NEGATIVE
INTERNAL POSITIVE CONTROL: POSITIVE
LYMPHOCYTES # BLD AUTO: 3.15 10*3/MM3 (ref 0.9–4.8)
LYMPHOCYTES NFR BLD AUTO: 30.6 % (ref 19.6–45.3)
Lab: NORMAL
MCH RBC QN AUTO: 30.4 PG (ref 26.9–32)
MCHC RBC AUTO-ENTMCNC: 31.9 G/DL (ref 32.4–36.3)
MCV RBC AUTO: 95.4 FL (ref 80.5–98.2)
MONOCYTES # BLD AUTO: 0.65 10*3/MM3 (ref 0.2–1.2)
MONOCYTES NFR BLD AUTO: 6.3 % (ref 5–12)
NEUTROPHILS # BLD AUTO: 6.45 10*3/MM3 (ref 1.9–8.1)
NEUTROPHILS NFR BLD AUTO: 62.5 % (ref 42.7–76)
PLATELET # BLD AUTO: 346 10*3/MM3 (ref 140–500)
PMV BLD AUTO: 9.8 FL (ref 6–12)
POTASSIUM BLD-SCNC: 3.8 MMOL/L (ref 3.5–5.2)
PROT SERPL-MCNC: 7.5 G/DL (ref 6–8.5)
RBC # BLD AUTO: 4.34 10*6/MM3 (ref 3.9–5.2)
SODIUM BLD-SCNC: 139 MMOL/L (ref 136–145)
WBC NRBC COR # BLD: 10.31 10*3/MM3 (ref 4.5–10.7)

## 2018-10-22 PROCEDURE — 80053 COMPREHEN METABOLIC PANEL: CPT | Performed by: INTERNAL MEDICINE

## 2018-10-22 PROCEDURE — 85025 COMPLETE CBC W/AUTO DIFF WBC: CPT | Performed by: INTERNAL MEDICINE

## 2018-10-22 PROCEDURE — 45380 COLONOSCOPY AND BIOPSY: CPT | Performed by: INTERNAL MEDICINE

## 2018-10-22 PROCEDURE — 25010000002 PROPOFOL 10 MG/ML EMULSION: Performed by: ANESTHESIOLOGY

## 2018-10-22 PROCEDURE — 81025 URINE PREGNANCY TEST: CPT | Performed by: INTERNAL MEDICINE

## 2018-10-22 PROCEDURE — 88305 TISSUE EXAM BY PATHOLOGIST: CPT | Performed by: INTERNAL MEDICINE

## 2018-10-22 RX ORDER — PROPOFOL 10 MG/ML
VIAL (ML) INTRAVENOUS CONTINUOUS PRN
Status: DISCONTINUED | OUTPATIENT
Start: 2018-10-22 | End: 2018-10-22 | Stop reason: SURG

## 2018-10-22 RX ORDER — SODIUM CHLORIDE 0.9 % (FLUSH) 0.9 %
3 SYRINGE (ML) INJECTION EVERY 12 HOURS SCHEDULED
Status: DISCONTINUED | OUTPATIENT
Start: 2018-10-22 | End: 2018-10-22 | Stop reason: HOSPADM

## 2018-10-22 RX ORDER — SODIUM CHLORIDE, SODIUM LACTATE, POTASSIUM CHLORIDE, CALCIUM CHLORIDE 600; 310; 30; 20 MG/100ML; MG/100ML; MG/100ML; MG/100ML
30 INJECTION, SOLUTION INTRAVENOUS CONTINUOUS PRN
Status: DISCONTINUED | OUTPATIENT
Start: 2018-10-22 | End: 2018-10-22 | Stop reason: HOSPADM

## 2018-10-22 RX ORDER — PROPOFOL 10 MG/ML
VIAL (ML) INTRAVENOUS AS NEEDED
Status: DISCONTINUED | OUTPATIENT
Start: 2018-10-22 | End: 2018-10-22 | Stop reason: SURG

## 2018-10-22 RX ORDER — LIDOCAINE HYDROCHLORIDE 20 MG/ML
INJECTION, SOLUTION INFILTRATION; PERINEURAL AS NEEDED
Status: DISCONTINUED | OUTPATIENT
Start: 2018-10-22 | End: 2018-10-22 | Stop reason: SURG

## 2018-10-22 RX ORDER — SODIUM CHLORIDE 0.9 % (FLUSH) 0.9 %
3-10 SYRINGE (ML) INJECTION AS NEEDED
Status: DISCONTINUED | OUTPATIENT
Start: 2018-10-22 | End: 2018-10-22 | Stop reason: HOSPADM

## 2018-10-22 RX ADMIN — SODIUM CHLORIDE, POTASSIUM CHLORIDE, SODIUM LACTATE AND CALCIUM CHLORIDE 30 ML/HR: 600; 310; 30; 20 INJECTION, SOLUTION INTRAVENOUS at 14:00

## 2018-10-22 RX ADMIN — LIDOCAINE HYDROCHLORIDE 60 MG: 20 INJECTION, SOLUTION INFILTRATION; PERINEURAL at 15:00

## 2018-10-22 RX ADMIN — PROPOFOL 50 MG: 10 INJECTION, EMULSION INTRAVENOUS at 15:05

## 2018-10-22 RX ADMIN — PROPOFOL 50 MG: 10 INJECTION, EMULSION INTRAVENOUS at 15:10

## 2018-10-22 RX ADMIN — PROPOFOL 100 MCG/KG/MIN: 10 INJECTION, EMULSION INTRAVENOUS at 15:00

## 2018-10-22 RX ADMIN — PROPOFOL 50 MG: 10 INJECTION, EMULSION INTRAVENOUS at 15:00

## 2018-10-22 RX ADMIN — PROPOFOL 50 MG: 10 INJECTION, EMULSION INTRAVENOUS at 15:15

## 2018-10-22 NOTE — ANESTHESIA PREPROCEDURE EVALUATION
Anesthesia Evaluation     Patient summary reviewed and Nursing notes reviewed                Airway   Mallampati: I  TM distance: >3 FB  Neck ROM: full  No difficulty expected  Dental - normal exam     Pulmonary - negative pulmonary ROS and normal exam   Cardiovascular - negative cardio ROS and normal exam        Neuro/Psych  (+) psychiatric history,     GI/Hepatic/Renal/Endo - negative ROS     Musculoskeletal (-) negative ROS    Abdominal  - normal exam    Bowel sounds: normal.   Substance History - negative use     OB/GYN negative ob/gyn ROS         Other                        Anesthesia Plan    ASA 2     MAC     Anesthetic plan, all risks, benefits, and alternatives have been provided, discussed and informed consent has been obtained with: patient.

## 2018-10-22 NOTE — ANESTHESIA POSTPROCEDURE EVALUATION
Patient: Marah Britt    Procedure Summary     Date:  10/22/18 Room / Location:  [facility]    Anesthesia Start:  1458 Anesthesia Stop:  1529    Procedure:  COLONOSCOPY to cecum and TI with biopsies (N/A ) Diagnosis:       Ulcerative pancolitis without complication (CMS/HCC)      (Ulcerative pancolitis without complication (CMS/HCC) [K51.00])    Surgeon:  David Capellan MD Provider:  Darnell Evangelista MD    Anesthesia Type:  MAC ASA Status:  2          Anesthesia Type: MAC  Last vitals  BP   105/78 (10/22/18 1546)   Temp   36.7 °C (98.1 °F) (10/22/18 1349)   Pulse   89 (10/22/18 1546)   Resp   16 (10/22/18 1546)     SpO2   100 % (10/22/18 1546)     Post Anesthesia Care and Evaluation    Patient location during evaluation: PACU  Patient participation: complete - patient participated  Level of consciousness: awake and alert  Pain management: adequate  Airway patency: patent  Anesthetic complications: No anesthetic complications    Cardiovascular status: acceptable  Respiratory status: acceptable  Hydration status: acceptable    Comments: --------------------            10/22/18               1546     --------------------   BP:       105/78     Pulse:      89       Resp:       16       Temp:                SpO2:      100%     --------------------

## 2018-10-22 NOTE — H&P
Chief Complaint   Patient presents with   • Follow-up   • Abdominal Pain         History of Present Illness: 42 yo female with h/o ulcerative colitis diagnosed in . On sulfasalazine 3 BID. She sometimes forgets the evening dose. NO diarrhea or constipation. No rectal bleeding or melena. NO abdominal or chest pain. Weight stable.      Medical History        Past Medical History:   Diagnosis Date   • ADHD     • Ulcerative colitis (CMS/HCC)              Surgical History         Past Surgical History:   Procedure Laterality Date   • BREAST AUGMENTATION       •  SECTION       • COLONOSCOPY   2015     left sided ulcerative colitis   • COLONOSCOPY   2011     Probable ulcerative proctitis? with some inflammation around the appendiceal orifice   • UPPER GASTROINTESTINAL ENDOSCOPY   2015     gastritis   • WISDOM TOOTH EXTRACTION                   Current Outpatient Prescriptions:   •  amphetamine-dextroamphetamine (ADDERALL) 5 MG tablet, Take 5 mg by mouth., Disp: , Rfl:   •  Prenatal Vit-Fe Fumarate-FA (PRENATAL VITAMIN PO), Take  by mouth., Disp: , Rfl:   •  PROBIOTIC PRODUCT PO, Take  by mouth., Disp: , Rfl:   •  spironolactone (ALDACTONE) 25 MG tablet, TK 1 TO 2 TS PO D, Disp: , Rfl: 0  •  sulfaSALAzine (AZULFIDINE) 500 MG tablet, Take 3 tablets by mouth twice a day, Disp: 180 tablet, Rfl: 11  •  cetirizine (zyrTEC) 10 MG tablet, Take 10 mg by mouth Daily., Disp: , Rfl:      No Known Allergies           Family History   Problem Relation Age of Onset   • Pancreatic cancer Maternal Grandmother           Social History   Social History            Social History   • Marital status:        Spouse name: N/A   • Number of children: N/A   • Years of education: N/A          Occupational History   • Not on file.             Social History Main Topics   • Smoking status: Never Smoker   • Smokeless tobacco: Never Used   • Alcohol use Yes         Comment: seldom   • Drug use: No   • Sexual activity:  Not on file           Other Topics Concern   • Not on file          Social History Narrative   • No narrative on file            Review of Systems   All other systems reviewed and are negative.            Vitals:     09/10/18 0835   BP: 118/82   Temp: 99 °F (37.2 °C)         Physical Exam   Constitutional: She is oriented to person, place, and time. She appears well-developed and well-nourished. No distress.   HENT:   Head: Normocephalic and atraumatic. Hair is normal.   Right Ear: Hearing, tympanic membrane, external ear and ear canal normal. No drainage. No decreased hearing is noted.   Left Ear: Hearing, tympanic membrane, external ear and ear canal normal. No decreased hearing is noted.   Nose: No nasal deformity.   Mouth/Throat: Oropharynx is clear and moist.   Eyes: Pupils are equal, round, and reactive to light. Conjunctivae, EOM and lids are normal. Right eye exhibits no discharge. Left eye exhibits no discharge.   Neck: Normal range of motion. Neck supple. No JVD present. No tracheal deviation present. No thyromegaly present.   Cardiovascular: Normal rate, regular rhythm, normal heart sounds, intact distal pulses and normal pulses.  Exam reveals no gallop and no friction rub.    No murmur heard.  Pulmonary/Chest: Effort normal and breath sounds normal. No respiratory distress. She has no wheezes. She has no rales. She exhibits no tenderness.   Abdominal: Soft. Bowel sounds are normal. She exhibits no distension and no mass. There is no tenderness. There is no rebound and no guarding. No hernia.   Musculoskeletal: Normal range of motion. She exhibits no edema, tenderness or deformity.   Lymphadenopathy:     She has no cervical adenopathy.   Neurological: She is alert and oriented to person, place, and time. She has normal reflexes. She displays normal reflexes. No cranial nerve deficit. She exhibits normal muscle tone. Coordination normal.   Skin: Skin is warm and dry. No rash noted. She is not diaphoretic.  No erythema.   Psychiatric: She has a normal mood and affect. Her behavior is normal. Judgment and thought content normal.   Vitals reviewed.        Marah was seen today for follow-up and abdominal pain.     Diagnoses and all orders for this visit:     Ulcerative pancolitis without complication (CMS/HCC)  -     Case Request; Standing  -     Case Request     Other orders  -     Follow Anesthesia Guidelines / Standing Orders; Future  -     Implement Anesthesia orders day of procedure.; Standing  -     Obtain informed consent; Standing  -     Verify bowel prep was successful; Standing  -     Give tap water enema if bowel prep was insufficient; Standing        Assessment:  1) h/o UC diagnosed in 2009.     Recommendations:  1) She will try to get Apriso 4/day instead of sulfasalazine  2) Colonoscopy     No Follow-up on file.     10/22/18 - No change from above H and P.   David Capellan MD

## 2018-10-24 LAB
CYTO UR: NORMAL
LAB AP CASE REPORT: NORMAL
PATH REPORT.FINAL DX SPEC: NORMAL
PATH REPORT.GROSS SPEC: NORMAL

## 2018-10-29 ENCOUNTER — TELEPHONE (OUTPATIENT)
Dept: GASTROENTEROLOGY | Facility: CLINIC | Age: 43
End: 2018-10-29

## 2018-10-29 NOTE — TELEPHONE ENCOUNTER
----- Message from David Capellan MD sent at 10/28/2018  2:51 PM EDT -----  Tell her that her labs look good. HEr colon biopsies came back normal with no evidence of colitis, which means that your UC is definitely in remission, which is good. I would continue the Apriso. I recommend a repeat colonoscopy in two years.. Thx. kjh

## 2018-10-29 NOTE — TELEPHONE ENCOUNTER
Called pt and advised per Dr Capellan that her labs look good.  Her colon bx came back normal with no evidence of colitis, which means that her uc is definitely in remission , which is good.  She should continue the apriso.  He recommends a repeat c/s in 2 yrs. Pt verb understandng.     C/s placed in recall for 10/22/2020.

## 2018-12-14 ENCOUNTER — OFFICE VISIT (OUTPATIENT)
Dept: OBSTETRICS AND GYNECOLOGY | Facility: CLINIC | Age: 43
End: 2018-12-14

## 2018-12-14 VITALS
BODY MASS INDEX: 31.61 KG/M2 | HEIGHT: 60 IN | WEIGHT: 161 LBS | SYSTOLIC BLOOD PRESSURE: 104 MMHG | DIASTOLIC BLOOD PRESSURE: 78 MMHG

## 2018-12-14 DIAGNOSIS — N60.11 CYSTIC BREAST, RIGHT: Primary | ICD-10-CM

## 2018-12-14 PROCEDURE — 99213 OFFICE O/P EST LOW 20 MIN: CPT | Performed by: OBSTETRICS & GYNECOLOGY

## 2018-12-14 RX ORDER — MESALAMINE 375 MG/1
CAPSULE, EXTENDED RELEASE ORAL
Refills: 11 | COMMUNITY
Start: 2018-12-07 | End: 2019-09-17 | Stop reason: SDUPTHER

## 2018-12-14 NOTE — PROGRESS NOTES
Subjective:    Patient Marah rBitt is a 43 y.o. female.   Chief Complaint   Patient presents with   • Breast Problem     LUMP RIGHT BREAST     CC patient's   5 years ago patient's had a long recovery period now has just started dating she does have ulcerative colitis and didn't feel a what she thought was a breast lump in the right breasts the middle part    HPI      The following portions of the patient's history were reviewed and updated as appropriate: allergies, current medications, past family history, past medical history, past social history, past surgical history and problem list.      Review of Systems   Constitutional: Negative.    HENT: Negative.    Eyes: Negative.    Respiratory: Negative.    Cardiovascular: Negative.    Gastrointestinal: Negative for abdominal distention, abdominal pain, anal bleeding, blood in stool, constipation, diarrhea, nausea, rectal pain and vomiting.   Endocrine: Negative for cold intolerance, heat intolerance, polydipsia, polyphagia and polyuria.   Genitourinary: Negative.  Negative for decreased urine volume, dyspareunia, dysuria, enuresis, flank pain, frequency, genital sores, hematuria, menstrual problem, pelvic pain, urgency, vaginal bleeding, vaginal discharge and vaginal pain.   Musculoskeletal: Negative.    Skin: Negative.    Allergic/Immunologic: Negative.    Neurological: Negative.    Hematological: Negative for adenopathy. Does not bruise/bleed easily.   Psychiatric/Behavioral: Negative for agitation, confusion and sleep disturbance. The patient is not nervous/anxious.          Objective:      Physical Exam   Cardiovascular: Normal rate, regular rhythm and normal heart sounds.   Pulmonary/Chest: Effort normal and breath sounds normal.    very careful breast exam bilateral patient does have a cystic breasts but the did not have any dominant lumps she has a few little skin cyst      Assessment and Plan:    Patient has been instructed to perform a self  breast exam on a weekly basis, a yearly mammogram, pap smear yearly unless instructed otherwise and bone density every 2 years.  I recommended that the patient not smoke, and discussed smoking cessation when appropriate.     Marah was seen today for breast problem.    Diagnoses and all orders for this visit:    Cystic breast, right  -     US Breast Bilateral Complete; Future

## 2019-01-21 ENCOUNTER — OFFICE VISIT (OUTPATIENT)
Dept: OBSTETRICS AND GYNECOLOGY | Facility: CLINIC | Age: 44
End: 2019-01-21

## 2019-01-21 VITALS
HEIGHT: 60 IN | BODY MASS INDEX: 29.84 KG/M2 | DIASTOLIC BLOOD PRESSURE: 78 MMHG | SYSTOLIC BLOOD PRESSURE: 104 MMHG | WEIGHT: 152 LBS

## 2019-01-21 DIAGNOSIS — N63.10 MASS OF BREAST, RIGHT: Primary | ICD-10-CM

## 2019-01-21 DIAGNOSIS — Z01.419 ENCOUNTER FOR ANNUAL ROUTINE GYNECOLOGICAL EXAMINATION: ICD-10-CM

## 2019-01-21 PROCEDURE — 99396 PREV VISIT EST AGE 40-64: CPT | Performed by: OBSTETRICS & GYNECOLOGY

## 2019-01-21 NOTE — PROGRESS NOTES
Subjective:    Patient Marah Britt is a 43 y.o. female.   Chief Complaint   Patient presents with   • Gynecologic Exam     AE,      CC patient presents still having a right ovarian right breast pain in the exam has improved with the patient being on the birth control pills she has bilateral implants but the patient is definitely improved as far as the fibrocystic disease but she'll be scheduled for a breast ultrasound and mammogram just to be sure    HPI patient      The following portions of the patient's history were reviewed and updated as appropriate: allergies, current medications, past family history, past medical history, past social history, past surgical history and problem list.      Review of Systems   Constitutional: Negative.    HENT: Negative.    Eyes: Negative.    Respiratory: Negative.    Cardiovascular: Negative.    Gastrointestinal: Negative for abdominal distention, abdominal pain, anal bleeding, blood in stool, constipation, diarrhea, nausea, rectal pain and vomiting.   Endocrine: Negative for cold intolerance, heat intolerance, polydipsia, polyphagia and polyuria.   Genitourinary: Negative.  Negative for decreased urine volume, dyspareunia, dysuria, enuresis, flank pain, frequency, genital sores, hematuria, menstrual problem, pelvic pain, urgency, vaginal bleeding, vaginal discharge and vaginal pain.   Musculoskeletal: Negative.    Skin: Negative.    Allergic/Immunologic: Negative.    Neurological: Negative.    Hematological: Negative for adenopathy. Does not bruise/bleed easily.   Psychiatric/Behavioral: Negative for agitation, confusion and sleep disturbance. The patient is not nervous/anxious.          Objective:      Physical Exam   Constitutional: She appears well-developed and well-nourished. She is not intubated.   HENT:   Head: Hair is normal.   Nose: Nose normal.   Mouth/Throat: Oropharynx is clear and moist.   Eyes: Conjunctivae are normal.   Neck: Normal carotid pulses and no JVD  present. No tracheal tenderness, no spinous process tenderness and no muscular tenderness present. Carotid bruit is not present. No neck rigidity. No edema, no erythema and normal range of motion present. No thyroid mass and no thyromegaly present.   Cardiovascular: Normal rate, regular rhythm, S1 normal and normal heart sounds. Exam reveals no gallop.   No murmur heard.  Pulmonary/Chest: Effort normal. No accessory muscle usage or stridor. No apnea, no tachypnea and no bradypnea. She is not intubated. No respiratory distress. She has no wheezes. She has no rales. She exhibits no tenderness. Right breast exhibits no inverted nipple, no mass, no nipple discharge, no skin change and no tenderness. Left breast exhibits no inverted nipple, no mass, no nipple discharge, no skin change and no tenderness.   Abdominal: Soft. Bowel sounds are normal. She exhibits no distension and no mass. There is no tenderness. There is no rebound and no guarding. No hernia.   Genitourinary: Vagina normal and uterus normal. Rectal exam shows no external hemorrhoid, no internal hemorrhoid, no fissure, no mass, no tenderness and anal tone normal. There is no rash, tenderness, lesion or injury on the right labia. There is no rash, tenderness, lesion or injury on the left labia. Uterus is not deviated, not enlarged, not fixed and not tender. Cervix exhibits no motion tenderness, no discharge and no friability. Right adnexum displays no mass and no tenderness. Left adnexum displays no mass and no tenderness. No erythema, tenderness or bleeding in the vagina. No foreign body in the vagina. No signs of injury around the vagina. No vaginal discharge found.   Musculoskeletal: She exhibits no edema or tenderness.        Right shoulder: She exhibits no tenderness, no swelling, no pain and no spasm.   Lymphadenopathy:        Head (right side): No submental, no submandibular, no tonsillar, no preauricular, no posterior auricular and no occipital  adenopathy present.        Head (left side): No submental, no submandibular, no tonsillar, no preauricular, no posterior auricular and no occipital adenopathy present.     She has no cervical adenopathy.        Right cervical: No superficial cervical, no deep cervical and no posterior cervical adenopathy present.       Left cervical: No superficial cervical, no deep cervical and no posterior cervical adenopathy present.        Right axillary: No pectoral and no lateral adenopathy present.        Left axillary: No pectoral and no lateral adenopathy present.       Right: No inguinal, no supraclavicular and no epitrochlear adenopathy present.        Left: No inguinal, no supraclavicular and no epitrochlear adenopathy present.   Neurological: No cranial nerve deficit. Coordination normal.   Skin: Skin is warm and dry. No abrasion, no bruising, no burn, no lesion, no petechiae, no purpura and no rash noted. Rash is not macular, not maculopapular, not nodular and not urticarial. No cyanosis or erythema. No pallor. Nails show no clubbing.   Psychiatric: She has a normal mood and affect. Her behavior is normal.         Assessment and Plan: Patient's breast exam has improved dramatically since she is on the pills and there was a cystic area in the right breast and the right breast is still somewhat tender but the exam has improved and will get a ultrasound and mammogram just to be sure the very careful breast exam is negative no masses no axillary lymph nodes no supraclavicular lymph nodes continue the birth control pills with the suppression and get the ultrasound and mammogram    Patient has been instructed to perform a self breast exam on a weekly basis, a yearly mammogram, pap smear yearly unless instructed otherwise and bone density every 2 years.  I recommended that the patient not smoke, and discussed smoking cessation when appropriate.     Marah was seen today for gynecologic exam.    Diagnoses and all orders for  this visit:    Mass of breast, right  -     Mammo Diagnostic Bilateral With CAD; Future    Encounter for annual routine gynecological examination  -     Pap IG, Rfx HPV ASCU

## 2019-01-24 LAB
CONV .: ABNORMAL
CYTOLOGIST CVX/VAG CYTO: ABNORMAL
CYTOLOGY CVX/VAG DOC THIN PREP: ABNORMAL
DX ICD CODE: ABNORMAL
DX ICD CODE: ABNORMAL
HIV 1 & 2 AB SER-IMP: ABNORMAL
OTHER STN SPEC: ABNORMAL
PATH REPORT.FINAL DX SPEC: ABNORMAL
PATHOLOGIST CVX/VAG CYTO: ABNORMAL
STAT OF ADQ CVX/VAG CYTO-IMP: ABNORMAL

## 2019-01-25 ENCOUNTER — TELEPHONE (OUTPATIENT)
Dept: OBSTETRICS AND GYNECOLOGY | Facility: CLINIC | Age: 44
End: 2019-01-25

## 2019-01-25 NOTE — TELEPHONE ENCOUNTER
----- Message from Willem Davey MD sent at 1/25/2019  8:27 AM EST -----  Schedule patient for colposcopy and repeat pap in  4 mos

## 2019-01-28 ENCOUNTER — HOSPITAL ENCOUNTER (OUTPATIENT)
Dept: ULTRASOUND IMAGING | Facility: HOSPITAL | Age: 44
End: 2019-01-28
Attending: OBSTETRICS & GYNECOLOGY

## 2019-01-28 ENCOUNTER — HOSPITAL ENCOUNTER (OUTPATIENT)
Dept: MAMMOGRAPHY | Facility: HOSPITAL | Age: 44
Discharge: HOME | End: 2019-01-28
Attending: OBSTETRICS & GYNECOLOGY | Admitting: OBSTETRICS & GYNECOLOGY

## 2019-01-28 DIAGNOSIS — N63.10 MASS OF BREAST, RIGHT: ICD-10-CM

## 2019-01-28 PROCEDURE — 77066 DX MAMMO INCL CAD BI: CPT

## 2019-03-08 ENCOUNTER — TELEPHONE (OUTPATIENT)
Dept: OBSTETRICS AND GYNECOLOGY | Facility: CLINIC | Age: 44
End: 2019-03-08

## 2019-03-08 NOTE — TELEPHONE ENCOUNTER
Pt. Called said she was suppose to schedule appointment for colposcopy 2 months from her annual that was done in January. I only seen a note in the chart to schedule her for 4 months from annual and was unsure when you wanted to schedule her for that. Pt. Also called said that three months after starting the birthcontrol she was put on she started spotting three days on days she wasn't suppose to have period. It was light bleeding and a dark brown she does not know if this is normal.

## 2019-03-11 RX ORDER — ETHYNODIOL DIACETATE AND ETHINYL ESTRADIOL 1 MG-35MCG
1 KIT ORAL DAILY
Qty: 84 TABLET | Refills: 3 | Status: SHIPPED | OUTPATIENT
Start: 2019-03-11 | End: 2019-09-23

## 2019-03-11 NOTE — TELEPHONE ENCOUNTER
Informed pt that she can do 2 months or she can come in at 4 months. Informed pt Dr. Davey says 4 months just to give cells time to get back to normal. Pt stated understanding but did have questions regarding pap. Dr. Davey spoke with pt regarding questions. Sent in [place] per Dr. Davey. SM

## 2019-04-15 ENCOUNTER — TELEPHONE (OUTPATIENT)
Dept: OBSTETRICS AND GYNECOLOGY | Facility: CLINIC | Age: 44
End: 2019-04-15

## 2019-04-15 NOTE — TELEPHONE ENCOUNTER
Patient called and stated that she had to be switched to ethynodiol-ethinyl estradiol (KELNOR,ZOVIA) 1-35 MG-MCG per tablet because of her insurance. She has been experiencing bruises on legs, not feeling well, and bad PMS. She has called her insurance and they stated they can pay for the [address], however an PA will have to be completed stating it is medically necessary. Patient is needing this medication within 3 days.         Thank you    (Pharmacy and phone number verified)

## 2019-09-09 ENCOUNTER — OFFICE VISIT (OUTPATIENT)
Dept: OBSTETRICS AND GYNECOLOGY | Facility: CLINIC | Age: 44
End: 2019-09-09

## 2019-09-09 VITALS
BODY MASS INDEX: 29.84 KG/M2 | HEIGHT: 60 IN | WEIGHT: 152 LBS | SYSTOLIC BLOOD PRESSURE: 118 MMHG | DIASTOLIC BLOOD PRESSURE: 75 MMHG

## 2019-09-09 DIAGNOSIS — N87.0 MILD DYSPLASIA OF CERVIX (CIN I): Primary | ICD-10-CM

## 2019-09-09 PROCEDURE — 99212 OFFICE O/P EST SF 10 MIN: CPT | Performed by: OBSTETRICS & GYNECOLOGY

## 2019-09-09 NOTE — PROGRESS NOTES
"Subjective    Chief Complaint   Patient presents with   • Follow-up     repeat pap       History of Present Illness    Marah Britt is a 44 y.o. female who presents for repeat Pap smear.  Patient had Pap smear 6 months ago which showed mild dysplasia.  Long discussion with patient about dysplasia in general and its work-up and evaluation.  Will perform colposcopy if today's Pap is not normal otherwise we will repeat a Pap at annual exam in 6 months.    Obstetric History:  OB History      Para Term  AB Living    1              SAB TAB Ectopic Molar Multiple Live Births              1         Menstrual History:     No LMP recorded.       Past Medical History:   Diagnosis Date   • Acne    • ADHD    • Ulcerative colitis (CMS/HCC)      Family History   Problem Relation Age of Onset   • Pancreatic cancer Maternal Grandmother        The following portions of the patient's history were reviewed and updated as appropriate: allergies, current medications, past family history, past medical history, past social history, past surgical history and problem list.    Review of Systems  Negative       Objective   Physical Exam  Vagina clear.  Cervix without lesion.  Pap smear performed.  Bimanual exam negative.  /75   Ht 152.4 cm (60\")   Wt 68.9 kg (152 lb)   BMI 29.69 kg/m²     Assessment/Plan   Marah was seen today for follow-up.    Diagnoses and all orders for this visit:    Mild dysplasia of cervix (VENKAT I)  -     IGP,rfx Aptima HPV All Pth        Return to office in 6 months for annual exam with Pap if today's is totally negative.               "

## 2019-09-14 LAB
CONV .: ABNORMAL
CYTOLOGIST CVX/VAG CYTO: ABNORMAL
CYTOLOGY CVX/VAG DOC CYTO: ABNORMAL
CYTOLOGY CVX/VAG DOC THIN PREP: ABNORMAL
DX ICD CODE: ABNORMAL
DX ICD CODE: ABNORMAL
HIV 1 & 2 AB SER-IMP: ABNORMAL
HPV I/H RISK 4 DNA CVX QL PROBE+SIG AMP: NEGATIVE
OTHER STN SPEC: ABNORMAL
PATHOLOGIST CVX/VAG CYTO: ABNORMAL
STAT OF ADQ CVX/VAG CYTO-IMP: ABNORMAL

## 2019-09-16 ENCOUNTER — TELEPHONE (OUTPATIENT)
Dept: OBSTETRICS AND GYNECOLOGY | Facility: CLINIC | Age: 44
End: 2019-09-16

## 2019-09-16 NOTE — TELEPHONE ENCOUNTER
----- Message from Alin Christian MD sent at 9/16/2019 12:51 PM EDT -----  Please tell patient that although this Pap just came back atypical with negative HPV, since her last Pap showed mild dysplasia and this one  is still slightly abnormal we really do need to schedule a colposcopy sometime within the next month.  Please place in recall.  Thank you.  ARLEY

## 2019-09-17 RX ORDER — MESALAMINE 375 MG/1
CAPSULE, EXTENDED RELEASE ORAL
Qty: 120 CAPSULE | Refills: 1 | Status: SHIPPED | OUTPATIENT
Start: 2019-09-17 | End: 2019-09-23 | Stop reason: SDUPTHER

## 2019-09-17 NOTE — TELEPHONE ENCOUNTER
MED ISSUE-APRISO   Received: Today      Call patient   Message Contents   [facility] [address]   Phone Number: [phone]     Called pt and advised that we have refilled her apriso take 4 tabs po daily for her to her [facility] pharmacy.   Pt verb understanding.

## 2019-09-18 RX ORDER — MESALAMINE 375 MG/1
CAPSULE, EXTENDED RELEASE ORAL
Qty: 120 CAPSULE | Refills: 0 | OUTPATIENT
Start: 2019-09-18

## 2019-09-23 ENCOUNTER — OFFICE VISIT (OUTPATIENT)
Dept: GASTROENTEROLOGY | Facility: CLINIC | Age: 44
End: 2019-09-23

## 2019-09-23 VITALS
DIASTOLIC BLOOD PRESSURE: 72 MMHG | BODY MASS INDEX: 27.29 KG/M2 | HEIGHT: 60 IN | WEIGHT: 139 LBS | TEMPERATURE: 98 F | SYSTOLIC BLOOD PRESSURE: 116 MMHG

## 2019-09-23 DIAGNOSIS — K51.00 ULCERATIVE PANCOLITIS WITHOUT COMPLICATION (HCC): Primary | ICD-10-CM

## 2019-09-23 DIAGNOSIS — R19.5 MUCUS IN STOOL: ICD-10-CM

## 2019-09-23 DIAGNOSIS — R10.30 LOWER ABDOMINAL PAIN: ICD-10-CM

## 2019-09-23 LAB
ALBUMIN SERPL-MCNC: 4.3 G/DL (ref 3.5–5.2)
ALBUMIN/GLOB SERPL: 1.6 G/DL
ALP SERPL-CCNC: 68 U/L (ref 39–117)
ALT SERPL-CCNC: 13 U/L (ref 1–33)
AMYLASE SERPL-CCNC: 67 U/L (ref 28–100)
AST SERPL-CCNC: 12 U/L (ref 1–32)
BASOPHILS # BLD AUTO: 0.04 10*3/MM3 (ref 0–0.2)
BASOPHILS NFR BLD AUTO: 0.5 % (ref 0–1.5)
BILIRUB SERPL-MCNC: 0.3 MG/DL (ref 0.2–1.2)
BUN SERPL-MCNC: 11 MG/DL (ref 6–20)
BUN/CREAT SERPL: 15.5 (ref 7–25)
CALCIUM SERPL-MCNC: 8.9 MG/DL (ref 8.6–10.5)
CHLORIDE SERPL-SCNC: 104 MMOL/L (ref 98–107)
CO2 SERPL-SCNC: 24.6 MMOL/L (ref 22–29)
CREAT SERPL-MCNC: 0.71 MG/DL (ref 0.57–1)
CRP SERPL-MCNC: 0.2 MG/DL (ref 0–0.5)
EOSINOPHIL # BLD AUTO: 0.05 10*3/MM3 (ref 0–0.4)
EOSINOPHIL NFR BLD AUTO: 0.6 % (ref 0.3–6.2)
ERYTHROCYTE [DISTWIDTH] IN BLOOD BY AUTOMATED COUNT: 11.5 % (ref 12.3–15.4)
ERYTHROCYTE [SEDIMENTATION RATE] IN BLOOD BY WESTERGREN METHOD: 6 MM/HR (ref 0–20)
GLOBULIN SER CALC-MCNC: 2.7 GM/DL
GLUCOSE SERPL-MCNC: 93 MG/DL (ref 65–99)
HCT VFR BLD AUTO: 42 % (ref 34–46.6)
HGB BLD-MCNC: 13.4 G/DL (ref 12–15.9)
IMM GRANULOCYTES # BLD AUTO: 0.02 10*3/MM3 (ref 0–0.05)
IMM GRANULOCYTES NFR BLD AUTO: 0.3 % (ref 0–0.5)
LIPASE SERPL-CCNC: 27 U/L (ref 13–60)
LYMPHOCYTES # BLD AUTO: 2.26 10*3/MM3 (ref 0.7–3.1)
LYMPHOCYTES NFR BLD AUTO: 28.4 % (ref 19.6–45.3)
MCH RBC QN AUTO: 30 PG (ref 26.6–33)
MCHC RBC AUTO-ENTMCNC: 31.9 G/DL (ref 31.5–35.7)
MCV RBC AUTO: 94 FL (ref 79–97)
MONOCYTES # BLD AUTO: 0.47 10*3/MM3 (ref 0.1–0.9)
MONOCYTES NFR BLD AUTO: 5.9 % (ref 5–12)
NEUTROPHILS # BLD AUTO: 5.12 10*3/MM3 (ref 1.7–7)
NEUTROPHILS NFR BLD AUTO: 64.3 % (ref 42.7–76)
NRBC BLD AUTO-RTO: 0 /100 WBC (ref 0–0.2)
PLATELET # BLD AUTO: 360 10*3/MM3 (ref 140–450)
POTASSIUM SERPL-SCNC: 4.5 MMOL/L (ref 3.5–5.2)
PROT SERPL-MCNC: 7 G/DL (ref 6–8.5)
RBC # BLD AUTO: 4.47 10*6/MM3 (ref 3.77–5.28)
SODIUM SERPL-SCNC: 140 MMOL/L (ref 136–145)
WBC # BLD AUTO: 7.96 10*3/MM3 (ref 3.4–10.8)

## 2019-09-23 PROCEDURE — 99214 OFFICE O/P EST MOD 30 MIN: CPT | Performed by: NURSE PRACTITIONER

## 2019-09-23 RX ORDER — MESALAMINE 1000 MG/1
1000 SUPPOSITORY RECTAL NIGHTLY
Qty: 42 SUPPOSITORY | Refills: 0 | Status: SHIPPED | OUTPATIENT
Start: 2019-09-23 | End: 2019-11-04

## 2019-09-23 RX ORDER — MESALAMINE 0.38 G/1
1500 CAPSULE, EXTENDED RELEASE ORAL DAILY
Qty: 120 CAPSULE | Refills: 11 | Status: SHIPPED | OUTPATIENT
Start: 2019-09-23 | End: 2020-05-14 | Stop reason: ALTCHOICE

## 2019-09-23 NOTE — PROGRESS NOTES
Chief Complaint   Patient presents with   • Ulcerative Colitis       Marah Britt is a  44 y.o. female here for a follow up visit for ulcerative colitis.    HPI  44-year-old female presents today for follow-up visit for ulcerative pancolitis.  She is a patient of Dr. Capellan.  She was last seen in the office on 9/10/2018.  She is a history of ulcerative pancolitis and admits lately she has been doing okay on 4 apriso a day but for the past couple weeks she has noticed worsening gas, mucus in her stools and her stools have been more frequent and loose.  She admits she has used Canasa suppositories in the past and these really seem to help at that time.  She is wondering if she should not use them again.  She denies any dysphagia, abdominal pain, nausea and vomiting, constipation, rectal bleeding or melena.  She admits her appetite is good and her weight is stable.  Was initially diagnosed with ulcerative pancolitis in .  Her last colonoscopy was done on 10/22/2018.  Past Medical History:   Diagnosis Date   • Acne    • ADHD    • Ulcerative colitis (CMS/HCC)        Past Surgical History:   Procedure Laterality Date   • AUGMENTATION MAMMAPLASTY     • BREAST AUGMENTATION     •  SECTION     • COLONOSCOPY  2015    left sided ulcerative colitis   • COLONOSCOPY  2011    Probable ulcerative proctitis? with some inflammation around the appendiceal orifice   • COLONOSCOPY N/A 10/22/2018    Normal   • UPPER GASTROINTESTINAL ENDOSCOPY  2015    gastritis   • WISDOM TOOTH EXTRACTION         Scheduled Meds:    Continuous Infusions:  No current facility-administered medications for this visit.     PRN Meds:.    No Known Allergies    Social History     Socioeconomic History   • Marital status:      Spouse name: Not on file   • Number of children: Not on file   • Years of education: Not on file   • Highest education level: Not on file   Tobacco Use   • Smoking status: Never Smoker   • Smokeless  tobacco: Never Used   Substance and Sexual Activity   • Alcohol use: Yes     Comment: seldom   • Drug use: No   • Sexual activity: Defer       Family History   Problem Relation Age of Onset   • Pancreatic cancer Maternal Grandmother        Review of Systems   Constitutional: Negative for appetite change, chills, diaphoresis, fatigue, fever and unexpected weight change.   HENT: Negative for nosebleeds, postnasal drip, sore throat, trouble swallowing and voice change.    Respiratory: Negative for cough, choking, chest tightness, shortness of breath and wheezing.    Cardiovascular: Negative for chest pain, palpitations and leg swelling.   Gastrointestinal: Positive for abdominal distention and diarrhea. Negative for abdominal pain, anal bleeding, blood in stool, constipation, nausea, rectal pain and vomiting.   Endocrine: Negative for polydipsia, polyphagia and polyuria.   Musculoskeletal: Negative for gait problem.   Skin: Negative for rash and wound.   Allergic/Immunologic: Negative for food allergies.   Neurological: Negative for dizziness, speech difficulty and light-headedness.   Psychiatric/Behavioral: Negative for confusion, self-injury, sleep disturbance and suicidal ideas.       Vitals:    09/23/19 1039   BP: 116/72   Temp: 98 °F (36.7 °C)       Physical Exam   Constitutional: She is oriented to person, place, and time. She appears well-developed and well-nourished. She does not appear ill. No distress.   HENT:   Head: Normocephalic.   Eyes: Pupils are equal, round, and reactive to light.   Cardiovascular: Normal rate, regular rhythm and normal heart sounds.   Pulmonary/Chest: Effort normal and breath sounds normal.   Abdominal: Soft. Bowel sounds are normal. She exhibits no distension and no mass. There is no hepatosplenomegaly. There is no tenderness. There is no rebound and no guarding. No hernia.   Musculoskeletal: Normal range of motion.   Neurological: She is alert and oriented to person, place, and  time.   Skin: Skin is warm and dry.   Psychiatric: She has a normal mood and affect. Her speech is normal and behavior is normal. Judgment normal.       No images are attached to the encounter.    Assessment and plan     1. Ulcerative pancolitis without complication (CMS/HCC)  - CBC & Differential  - Comprehensive Metabolic Panel  - Amylase  - Lipase  - Sedimentation Rate  - C-reactive Protein  - mesalamine (APRISO) 0.375 g 24 hr capsule; Take 4 capsules by mouth Daily.  Dispense: 120 capsule; Refill: 11  - mesalamine (CANASA) 1000 MG suppository; Insert 1 suppository into the rectum Every Night for 42 days.  Dispense: 42 suppository; Refill: 0    2. Lower abdominal pain  - CBC & Differential  - Comprehensive Metabolic Panel  - Amylase  - Lipase  - Sedimentation Rate  - C-reactive Protein    3. Mucus in stool      UC does not sound well-controlled at this time.  Will go ahead and check routine labs today.  Continue the apriso 4 tabs daily for now.  Will go ahead and add one Canasa suppository per rectum at bedtime for the next 30 days.  Patient to call the office next week with an update.  Patient to follow-up with me in 3 weeks.

## 2019-09-24 ENCOUNTER — TELEPHONE (OUTPATIENT)
Dept: GASTROENTEROLOGY | Facility: CLINIC | Age: 44
End: 2019-09-24

## 2019-09-24 NOTE — TELEPHONE ENCOUNTER
Called pt and advised per Meghann NP that her labs look great.  All were normal.   Pt verb understanding.

## 2019-09-24 NOTE — TELEPHONE ENCOUNTER
----- Message from MELISA Perkins sent at 9/24/2019  9:02 AM EDT -----  Please call the patient and let her know her labs look great.  All were normal.

## 2019-09-25 ENCOUNTER — PRIOR AUTHORIZATION (OUTPATIENT)
Dept: GASTROENTEROLOGY | Facility: CLINIC | Age: 44
End: 2019-09-25

## 2019-09-26 ENCOUNTER — TELEPHONE (OUTPATIENT)
Dept: GASTROENTEROLOGY | Facility: CLINIC | Age: 44
End: 2019-09-26

## 2019-09-26 NOTE — TELEPHONE ENCOUNTER
----- Message from Chetan Moraes Rep sent at 9/26/2019 10:46 AM EDT -----  Regarding: meds  Contact: [phone]  Pt said her insurance will not cover the Canasa and would like to be out on something else. [facility] on [facility] noelle.

## 2019-09-30 ENCOUNTER — TELEPHONE (OUTPATIENT)
Dept: GASTROENTEROLOGY | Facility: CLINIC | Age: 44
End: 2019-09-30

## 2019-09-30 NOTE — TELEPHONE ENCOUNTER
----- Message from Chetan Moraes sent at 9/30/2019  1:20 PM EDT -----  Regarding: follow up  Contact: [phone]  Pt called and said she still has not been able to get her meds.

## 2019-09-30 NOTE — TELEPHONE ENCOUNTER
See note of 9/25.    Call to pt . Advise that PA for Canasa supp has been approved.  Verb understanding.

## 2019-10-14 ENCOUNTER — OFFICE VISIT (OUTPATIENT)
Dept: OBSTETRICS AND GYNECOLOGY | Facility: CLINIC | Age: 44
End: 2019-10-14

## 2019-10-14 VITALS
BODY MASS INDEX: 27.29 KG/M2 | HEIGHT: 60 IN | WEIGHT: 139 LBS | DIASTOLIC BLOOD PRESSURE: 70 MMHG | SYSTOLIC BLOOD PRESSURE: 116 MMHG

## 2019-10-14 DIAGNOSIS — R87.610 ATYPICAL SQUAMOUS CELLS OF UNDETERMINED SIGNIFICANCE ON CYTOLOGIC SMEAR OF CERVIX (ASC-US): Primary | ICD-10-CM

## 2019-10-14 PROCEDURE — 57454 BX/CURETT OF CERVIX W/SCOPE: CPT | Performed by: OBSTETRICS & GYNECOLOGY

## 2019-10-14 NOTE — PROGRESS NOTES
Colposcopy    Date of procedure:  10/14/2019    Risks and benefits discussed? yes  All questions answered? yes  Consents given by the patient  Written consent obtained? yes    Local anesthesia used:  no    Pre-op indication: LGSIL - mild dysplasia on pap followed by Pap ASCUS Neg HPV    Procedure documentation:    The cervix was initially viewed colposcopically through a green filter.  The cervix was next bathed in acetic acid.   The findings were as follows:      The transformation zone was not able to be seen adequately.    Acetowhite noted at 7 o'clock    Ectocervical biopsies taken from 7 o'clock.  Monsels solution was applied to the biopsy sites..    An ECC was performed.      Colposcopic Impression: 1. Adequate colposcopy.  Very questionable white lesion noted at 7:00 which may also be normal cervical tissue.  Biopsy taken anyway.  2. Colposcopic findings are consistent with PAP       Plan: Planning 6-month follow-up Pap smear.  If mild dysplasia on cervical biopsy will schedule cryosurgery.      This note was electronically signed.          Alin Christian MD   October 14, 2019

## 2019-10-17 LAB
DX ICD CODE: NORMAL
PATH REPORT.FINAL DX SPEC: NORMAL
PATH REPORT.GROSS SPEC: NORMAL
PATH REPORT.SITE OF ORIGIN SPEC: NORMAL
PATHOLOGIST NAME: NORMAL
PAYMENT PROCEDURE: NORMAL

## 2019-10-22 ENCOUNTER — TELEPHONE (OUTPATIENT)
Dept: OBSTETRICS AND GYNECOLOGY | Facility: CLINIC | Age: 44
End: 2019-10-22

## 2019-10-22 NOTE — TELEPHONE ENCOUNTER
----- Message from Alin Christian MD sent at 10/21/2019  8:53 AM EDT -----  Please tell pt cervical biopsies neg for dysplasia so just needs 6 month pap as discussed. Thanks ARLEY

## 2019-10-28 ENCOUNTER — OFFICE VISIT (OUTPATIENT)
Dept: GASTROENTEROLOGY | Facility: CLINIC | Age: 44
End: 2019-10-28

## 2019-10-28 VITALS
WEIGHT: 134.4 LBS | TEMPERATURE: 98.3 F | HEIGHT: 60 IN | DIASTOLIC BLOOD PRESSURE: 82 MMHG | BODY MASS INDEX: 26.39 KG/M2 | SYSTOLIC BLOOD PRESSURE: 110 MMHG

## 2019-10-28 DIAGNOSIS — R19.5 MUCUS IN STOOL: ICD-10-CM

## 2019-10-28 DIAGNOSIS — K51.00 ULCERATIVE PANCOLITIS WITHOUT COMPLICATION (HCC): Primary | ICD-10-CM

## 2019-10-28 DIAGNOSIS — R10.30 LOWER ABDOMINAL PAIN: ICD-10-CM

## 2019-10-28 PROCEDURE — 99214 OFFICE O/P EST MOD 30 MIN: CPT | Performed by: NURSE PRACTITIONER

## 2019-10-28 NOTE — PROGRESS NOTES
Chief Complaint   Patient presents with   • Follow-up   • Ulcerative Colitis       Marah Britt is a  44 y.o. female here for a follow up visit for ulcerative colitis.    HPI  44-year-old female presents today for a follow-up visit for pancolitis with lower abdominal pain.  She is a patient of Dr. Capellan.  She was last seen in the office on 2019.  She is happy to report that since increasing her apriso to 4 daily and starting Canasa suppositories all of her symptoms have completely resolved.  She has had about 2 weeks worth of Canasa at this point.  She denies any dysphagia, abdominal pain, nausea vomiting, constipation, diarrhea, rectal bleeding or melena.  She was her appetite is good and her weight is stable.  Last colonoscopy was done on 10/2018.  Her last EGD was done in 2015.  Past Medical History:   Diagnosis Date   • Acne    • ADHD    • Ulcerative colitis (CMS/HCC)        Past Surgical History:   Procedure Laterality Date   • AUGMENTATION MAMMAPLASTY     • BREAST AUGMENTATION     •  SECTION     • COLONOSCOPY  2015    left sided ulcerative colitis   • COLONOSCOPY  2011    Probable ulcerative proctitis? with some inflammation around the appendiceal orifice   • COLONOSCOPY N/A 10/22/2018    Normal   • UPPER GASTROINTESTINAL ENDOSCOPY  2015    gastritis   • WISDOM TOOTH EXTRACTION         Scheduled Meds:    Continuous Infusions:  No current facility-administered medications for this visit.     PRN Meds:.    No Known Allergies    Social History     Socioeconomic History   • Marital status:      Spouse name: Not on file   • Number of children: Not on file   • Years of education: Not on file   • Highest education level: Not on file   Tobacco Use   • Smoking status: Never Smoker   • Smokeless tobacco: Never Used   Substance and Sexual Activity   • Alcohol use: Yes     Comment: rare    • Drug use: No   • Sexual activity: Defer       Family History   Problem Relation Age of  Onset   • Pancreatic cancer Maternal Grandmother        Review of Systems   Constitutional: Negative for appetite change, chills, diaphoresis, fatigue, fever and unexpected weight change.   HENT: Negative for nosebleeds, postnasal drip, sore throat, trouble swallowing and voice change.    Respiratory: Negative for cough, choking, chest tightness, shortness of breath and wheezing.    Cardiovascular: Negative for chest pain, palpitations and leg swelling.   Gastrointestinal: Negative for abdominal distention, abdominal pain, anal bleeding, blood in stool, constipation, diarrhea, nausea, rectal pain and vomiting.   Endocrine: Negative for polydipsia, polyphagia and polyuria.   Musculoskeletal: Negative for gait problem.   Skin: Negative for rash and wound.   Allergic/Immunologic: Negative for food allergies.   Neurological: Negative for dizziness, speech difficulty and light-headedness.   Psychiatric/Behavioral: Negative for confusion, self-injury, sleep disturbance and suicidal ideas.       Vitals:    10/28/19 1337   BP: 110/82   Temp: 98.3 °F (36.8 °C)       Physical Exam   Constitutional: She is oriented to person, place, and time. She appears well-developed and well-nourished. She does not appear ill. No distress.   HENT:   Head: Normocephalic.   Eyes: Pupils are equal, round, and reactive to light.   Cardiovascular: Normal rate, regular rhythm and normal heart sounds.   Pulmonary/Chest: Effort normal and breath sounds normal.   Abdominal: Soft. Bowel sounds are normal. She exhibits no distension and no mass. There is no hepatosplenomegaly. There is no tenderness. There is no rebound and no guarding. No hernia.   Musculoskeletal: Normal range of motion.   Neurological: She is alert and oriented to person, place, and time.   Skin: Skin is warm and dry.   Psychiatric: She has a normal mood and affect. Her speech is normal and behavior is normal. Judgment normal.       No images are attached to the  encounter.    Assessment and plan    1. Ulcerative pancolitis without complication (CMS/HCC)    2. Lower abdominal pain    3. Mucus in stool    Reviewed labs with her today.  Labs were good.  UC seems much improved today after starting Canasa x2 weeks along with the apriso 4 tabs daily.  Okay to stop the Canasa for now.  Abdominal pain has resolved and mucus has also greatly decreased.  Bowels moving well today.  Patient to call the office with any issues.  Patient to follow-up with me in 3 months.

## 2019-11-19 RX ORDER — MESALAMINE 375 MG/1
CAPSULE, EXTENDED RELEASE ORAL
Qty: 120 CAPSULE | Refills: 0 | OUTPATIENT
Start: 2019-11-19

## 2020-02-03 ENCOUNTER — OFFICE VISIT (OUTPATIENT)
Dept: GASTROENTEROLOGY | Facility: CLINIC | Age: 45
End: 2020-02-03

## 2020-02-03 VITALS
HEIGHT: 60 IN | DIASTOLIC BLOOD PRESSURE: 72 MMHG | SYSTOLIC BLOOD PRESSURE: 100 MMHG | WEIGHT: 146.8 LBS | BODY MASS INDEX: 28.82 KG/M2 | TEMPERATURE: 98.1 F

## 2020-02-03 DIAGNOSIS — K51.00 ULCERATIVE PANCOLITIS WITHOUT COMPLICATION (HCC): Primary | ICD-10-CM

## 2020-02-03 DIAGNOSIS — R19.5 MUCUS IN STOOL: ICD-10-CM

## 2020-02-03 LAB
25(OH)D3+25(OH)D2 SERPL-MCNC: 35.9 NG/ML (ref 30–100)
ALBUMIN SERPL-MCNC: 4.1 G/DL (ref 3.5–5.2)
ALBUMIN/GLOB SERPL: 1.6 G/DL
ALP SERPL-CCNC: 65 U/L (ref 39–117)
ALT SERPL-CCNC: 13 U/L (ref 1–33)
AST SERPL-CCNC: 15 U/L (ref 1–32)
BASOPHILS # BLD AUTO: 0.05 10*3/MM3 (ref 0–0.2)
BASOPHILS NFR BLD AUTO: 0.5 % (ref 0–1.5)
BILIRUB SERPL-MCNC: 0.3 MG/DL (ref 0.2–1.2)
BUN SERPL-MCNC: 14 MG/DL (ref 6–20)
BUN/CREAT SERPL: 12.6 (ref 7–25)
CALCIUM SERPL-MCNC: 9 MG/DL (ref 8.6–10.5)
CHLORIDE SERPL-SCNC: 101 MMOL/L (ref 98–107)
CO2 SERPL-SCNC: 25.1 MMOL/L (ref 22–29)
CREAT SERPL-MCNC: 1.11 MG/DL (ref 0.57–1)
CRP SERPL-MCNC: 0.27 MG/DL (ref 0–0.5)
EOSINOPHIL # BLD AUTO: 0.08 10*3/MM3 (ref 0–0.4)
EOSINOPHIL NFR BLD AUTO: 0.8 % (ref 0.3–6.2)
ERYTHROCYTE [DISTWIDTH] IN BLOOD BY AUTOMATED COUNT: 12.2 % (ref 12.3–15.4)
ERYTHROCYTE [SEDIMENTATION RATE] IN BLOOD BY WESTERGREN METHOD: 4 MM/HR (ref 0–20)
GLOBULIN SER CALC-MCNC: 2.5 GM/DL
GLUCOSE SERPL-MCNC: 87 MG/DL (ref 65–99)
HCT VFR BLD AUTO: 38.9 % (ref 34–46.6)
HGB BLD-MCNC: 13.2 G/DL (ref 12–15.9)
IMM GRANULOCYTES # BLD AUTO: 0.04 10*3/MM3 (ref 0–0.05)
IMM GRANULOCYTES NFR BLD AUTO: 0.4 % (ref 0–0.5)
LYMPHOCYTES # BLD AUTO: 2.71 10*3/MM3 (ref 0.7–3.1)
LYMPHOCYTES NFR BLD AUTO: 27.2 % (ref 19.6–45.3)
MCH RBC QN AUTO: 31 PG (ref 26.6–33)
MCHC RBC AUTO-ENTMCNC: 33.9 G/DL (ref 31.5–35.7)
MCV RBC AUTO: 91.3 FL (ref 79–97)
MONOCYTES # BLD AUTO: 0.5 10*3/MM3 (ref 0.1–0.9)
MONOCYTES NFR BLD AUTO: 5 % (ref 5–12)
NEUTROPHILS # BLD AUTO: 6.6 10*3/MM3 (ref 1.7–7)
NEUTROPHILS NFR BLD AUTO: 66.1 % (ref 42.7–76)
NRBC BLD AUTO-RTO: 0 /100 WBC (ref 0–0.2)
PLATELET # BLD AUTO: 332 10*3/MM3 (ref 140–450)
POTASSIUM SERPL-SCNC: 4.3 MMOL/L (ref 3.5–5.2)
PROT SERPL-MCNC: 6.6 G/DL (ref 6–8.5)
RBC # BLD AUTO: 4.26 10*6/MM3 (ref 3.77–5.28)
SODIUM SERPL-SCNC: 137 MMOL/L (ref 136–145)
WBC # BLD AUTO: 9.98 10*3/MM3 (ref 3.4–10.8)

## 2020-02-03 PROCEDURE — 99214 OFFICE O/P EST MOD 30 MIN: CPT | Performed by: NURSE PRACTITIONER

## 2020-02-03 RX ORDER — MESALAMINE 1000 MG/1
1000 SUPPOSITORY RECTAL NIGHTLY
Qty: 30 SUPPOSITORY | Refills: 5 | Status: SHIPPED | OUTPATIENT
Start: 2020-02-03 | End: 2020-07-24

## 2020-02-03 RX ORDER — MESALAMINE 1000 MG/1
1000 SUPPOSITORY RECTAL AS NEEDED
COMMUNITY
End: 2020-02-03 | Stop reason: SDUPTHER

## 2020-02-03 NOTE — PROGRESS NOTES
Chief Complaint   Patient presents with   • Follow-up   • Ulcerative Colitis   • Mucous in stool       Marah Britt is a  44 y.o. female here for a follow up visit for ulcerative colitis.    HPI  44-year-old female presents today for follow-up visit for ulcerative pancolitis.  She is a patient of Dr. Capellan.  She was last seen in the office on 10/28/2019.  She has a history of ulcerative pancolitis and normally does very well on apriso 4 tabs daily.  She admits since December she really went off her diet starting a lot of stuff that she does not normally eat including lots of processed and greasy foods.  For the past several weeks she is been having a lot more mucus in her stool with some lower abdominal pain and cramping.  She denies any rectal bleeding at this time.  She has not used any Canasa because she is tells me she is almost out of her supply at home.  Use some Canasa maybe 1 or 2 last week but nothing routinely.  She denies any dysphagia, reflux, nausea and vomiting, constipation, rectal bleeding or melena.  She was appetite is good and her weight is stable.  Her last colonoscopy was done on 10/22/2018.  It was normal at that time.  Her last EGD was done on 2015.  She tells me she is up-to-date on all health screenings and vaccinations.    Past Medical History:   Diagnosis Date   • Acne    • ADHD    • Ulcerative colitis (CMS/HCC)        Past Surgical History:   Procedure Laterality Date   • AUGMENTATION MAMMAPLASTY     • BREAST AUGMENTATION     •  SECTION     • COLONOSCOPY  2015    left sided ulcerative colitis   • COLONOSCOPY  2011    Probable ulcerative proctitis? with some inflammation around the appendiceal orifice   • COLONOSCOPY N/A 10/22/2018    Normal   • UPPER GASTROINTESTINAL ENDOSCOPY  2015    gastritis   • WISDOM TOOTH EXTRACTION         Scheduled Meds:    Continuous Infusions:  No current facility-administered medications for this visit.     PRN Meds:.    No Known  Allergies    Social History     Socioeconomic History   • Marital status:      Spouse name: Not on file   • Number of children: Not on file   • Years of education: Not on file   • Highest education level: Not on file   Tobacco Use   • Smoking status: Never Smoker   • Smokeless tobacco: Never Used   Substance and Sexual Activity   • Alcohol use: Yes     Comment: rare    • Drug use: No   • Sexual activity: Defer       Family History   Problem Relation Age of Onset   • Pancreatic cancer Maternal Grandmother        Review of Systems   Constitutional: Negative for appetite change, chills, diaphoresis, fatigue, fever and unexpected weight change.   HENT: Negative for nosebleeds, postnasal drip, sore throat, trouble swallowing and voice change.    Respiratory: Negative for cough, choking, chest tightness, shortness of breath and wheezing.    Cardiovascular: Negative for chest pain, palpitations and leg swelling.   Gastrointestinal: Positive for abdominal distention. Negative for abdominal pain, anal bleeding, blood in stool, constipation, diarrhea, nausea, rectal pain and vomiting.   Endocrine: Negative for polydipsia, polyphagia and polyuria.   Musculoskeletal: Negative for gait problem.   Skin: Negative for rash and wound.   Allergic/Immunologic: Negative for food allergies.   Neurological: Negative for dizziness, speech difficulty and light-headedness.   Psychiatric/Behavioral: Negative for confusion, self-injury, sleep disturbance and suicidal ideas.       Vitals:    02/03/20 1048   BP: 100/72   Temp: 98.1 °F (36.7 °C)       Physical Exam   Constitutional: She is oriented to person, place, and time. She appears well-developed and well-nourished. She does not appear ill. No distress.   HENT:   Head: Normocephalic.   Eyes: Pupils are equal, round, and reactive to light.   Cardiovascular: Normal rate, regular rhythm and normal heart sounds.   Pulmonary/Chest: Effort normal and breath sounds normal.   Abdominal:  Soft. Bowel sounds are normal. She exhibits distension. She exhibits no mass. There is no hepatosplenomegaly. There is no tenderness. There is no rebound and no guarding. No hernia.   Musculoskeletal: Normal range of motion.   Neurological: She is alert and oriented to person, place, and time.   Skin: Skin is warm and dry.   Psychiatric: She has a normal mood and affect. Her speech is normal and behavior is normal. Judgment normal.       No radiology results for the last 7 days     Assessment and plan     1. Ulcerative pancolitis without complication (CMS/HCC)  - mesalamine (CANASA) 1000 MG suppository; Insert 1 suppository into the rectum Every Night.  Dispense: 30 suppository; Refill: 5    2. Mucus in stool      Will go ahead and check some routine labs today.  Go ahead and continue the apriso for daily.  I want her back on the nightly Canasa suppositories at least for the next 2 weeks.  I will go ahead and refill those today.  Patient would like to try the FODMAP diet to see what foods might really be making her UC worse.  We will go ahead and give her a handout on that today.  Patient to call the office next week with an update.  Patient to follow-up with Dr. Capellan in 3 months.  Patient to call the office with any issues.

## 2020-02-04 ENCOUNTER — TELEPHONE (OUTPATIENT)
Dept: GASTROENTEROLOGY | Facility: CLINIC | Age: 45
End: 2020-02-04

## 2020-02-04 NOTE — TELEPHONE ENCOUNTER
Returned pt's call and advised per Meghann DAVID that her labs look really good.  Her white count is normal and her hgb is stable.  Her inflammatory markers are also normal.     Pt verb understanding and reports she is doing really good. Update sent to Meghann DAVID.

## 2020-02-04 NOTE — TELEPHONE ENCOUNTER
----- Message from MELISA Perkins sent at 2/4/2020  9:21 AM EST -----  These call the patient and let her know that her labs look really good.  Her white count is normal and her hemoglobin is stable.  Her inflammatory markers are also normal.  Thanks how is she doing today?

## 2020-04-14 ENCOUNTER — TELEPHONE (OUTPATIENT)
Dept: OBSTETRICS AND GYNECOLOGY | Facility: CLINIC | Age: 45
End: 2020-04-14

## 2020-04-14 NOTE — TELEPHONE ENCOUNTER
Patient was a Dr Davey pt. She has been seeing Dr Christian.She said that she would prefer a female doctor. There are no issues with Dr Christian she said just wants a female. Would you be willing to see this patient ? Pt Ph [phone]

## 2020-04-27 ENCOUNTER — OFFICE VISIT (OUTPATIENT)
Dept: OBSTETRICS AND GYNECOLOGY | Facility: CLINIC | Age: 45
End: 2020-04-27

## 2020-04-27 VITALS
SYSTOLIC BLOOD PRESSURE: 118 MMHG | BODY MASS INDEX: 28.66 KG/M2 | WEIGHT: 146 LBS | DIASTOLIC BLOOD PRESSURE: 80 MMHG | HEIGHT: 60 IN

## 2020-04-27 DIAGNOSIS — Z86.39 HX OF THYROID DISEASE: ICD-10-CM

## 2020-04-27 DIAGNOSIS — Z12.39 SCREENING FOR BREAST CANCER: ICD-10-CM

## 2020-04-27 DIAGNOSIS — Z01.419 ENCOUNTER FOR GYNECOLOGICAL EXAMINATION WITHOUT ABNORMAL FINDING: Primary | ICD-10-CM

## 2020-04-27 DIAGNOSIS — Z87.42 HISTORY OF ABNORMAL CERVICAL PAP SMEAR: ICD-10-CM

## 2020-04-27 PROCEDURE — 99396 PREV VISIT EST AGE 40-64: CPT | Performed by: OBSTETRICS & GYNECOLOGY

## 2020-04-28 LAB
CONV .: NORMAL
CYTOLOGIST CVX/VAG CYTO: NORMAL
CYTOLOGY CVX/VAG DOC CYTO: NORMAL
CYTOLOGY CVX/VAG DOC THIN PREP: NORMAL
DX ICD CODE: NORMAL
FT4I SERPL CALC-MCNC: 2.7 (ref 1.2–4.9)
HIV 1 & 2 AB SER-IMP: NORMAL
OTHER STN SPEC: NORMAL
STAT OF ADQ CVX/VAG CYTO-IMP: NORMAL
T3RU NFR SERPL: 25 % (ref 24–39)
T4 SERPL-MCNC: 10.8 UG/DL (ref 4.5–12)
TSH SERPL DL<=0.005 MIU/L-ACNC: 2.25 UIU/ML (ref 0.45–4.5)

## 2020-04-29 ENCOUNTER — TELEPHONE (OUTPATIENT)
Dept: OBSTETRICS AND GYNECOLOGY | Facility: CLINIC | Age: 45
End: 2020-04-29

## 2020-04-29 NOTE — TELEPHONE ENCOUNTER
----- Message from Alin Christian MD sent at 4/28/2020  8:28 AM EDT -----  Please tell patient that her thyroid tests were totally normal.  ARLEY

## 2020-05-11 ENCOUNTER — PRIOR AUTHORIZATION (OUTPATIENT)
Dept: GASTROENTEROLOGY | Facility: CLINIC | Age: 45
End: 2020-05-11

## 2020-05-13 NOTE — TELEPHONE ENCOUNTER
Please have the patient call her insurance and find out what the formulary is because it cut off in the letter.  That way we can know what to trial her on then if it does not work we can switch her back to apriso.

## 2020-05-13 NOTE — TELEPHONE ENCOUNTER
Call to pt.  Advise per M Jazmine note.  Verb understanding.  States will f/u and call back.    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  Asking if any particular worry for exposure to covid 2nd UC.  Question to M Jazmine.

## 2020-05-13 NOTE — TELEPHONE ENCOUNTER
Call to [employer] @ [phone] and spoke with Rachel CUELLAR  Transferred to  and spoke with Kaleigh.  Request denial letter be refaxed to .

## 2020-05-13 NOTE — TELEPHONE ENCOUNTER
Call returned to pt.  States sulfasalazine was mentioned.  Denial letter also looks like balsalazide offered.      Pt has tried both of these without success.     Message to ASHLIE Lucero.

## 2020-05-14 RX ORDER — MESALAMINE 1.2 G/1
TABLET, DELAYED RELEASE ORAL
Qty: 120 TABLET | Refills: 1 | Status: SHIPPED | OUTPATIENT
Start: 2020-05-14 | End: 2020-06-12 | Stop reason: SDUPTHER

## 2020-05-14 NOTE — TELEPHONE ENCOUNTER
Call to pt.  Advise per M Jazmine note.  Verb understanding.  F/u appt scheduled for 6/1 @ 10:15 am.     Maycol completed for lialda per M Jazmine order, #120, R1.

## 2020-05-14 NOTE — TELEPHONE ENCOUNTER
Lialda is the closest thing to apriso that we have.  I would do the Lialda 1.2 g 4 tabs p.o. daily.  Yes with her having UC it does put her at greater risk of getting COVID-19 so she really does need to be taking extra precautions that is recommended by the CDC.  Have her follow-up with us in the office in a couple of weeks to see how she is doing on the Lialda.  Thanks

## 2020-05-14 NOTE — TELEPHONE ENCOUNTER
Call to pt.  States has never tried lialda - willing to try.      Also asking if UC will place her at any increased for covid when returns to work ([employer]).  Will use appropriate protective gear.     Update/question to ASHLIE Lucero.

## 2020-06-08 ENCOUNTER — APPOINTMENT (OUTPATIENT)
Dept: WOMENS IMAGING | Facility: HOSPITAL | Age: 45
End: 2020-06-08

## 2020-06-08 ENCOUNTER — PROCEDURE VISIT (OUTPATIENT)
Dept: OBSTETRICS AND GYNECOLOGY | Facility: CLINIC | Age: 45
End: 2020-06-08

## 2020-06-08 DIAGNOSIS — Z12.31 VISIT FOR SCREENING MAMMOGRAM: Primary | ICD-10-CM

## 2020-06-08 PROCEDURE — 77063 BREAST TOMOSYNTHESIS BI: CPT | Performed by: RADIOLOGY

## 2020-06-08 PROCEDURE — 77067 SCR MAMMO BI INCL CAD: CPT | Performed by: RADIOLOGY

## 2020-06-08 PROCEDURE — 77067 SCR MAMMO BI INCL CAD: CPT | Performed by: OBSTETRICS & GYNECOLOGY

## 2020-06-08 PROCEDURE — 77063 BREAST TOMOSYNTHESIS BI: CPT | Performed by: OBSTETRICS & GYNECOLOGY

## 2020-06-11 DIAGNOSIS — R92.8 ABNORMAL MAMMOGRAM: Primary | ICD-10-CM

## 2020-06-11 DIAGNOSIS — N64.89 BREAST ASYMMETRY: ICD-10-CM

## 2020-06-12 ENCOUNTER — TELEPHONE (OUTPATIENT)
Dept: GASTROENTEROLOGY | Facility: CLINIC | Age: 45
End: 2020-06-12

## 2020-06-12 RX ORDER — MESALAMINE 1.2 G/1
TABLET, DELAYED RELEASE ORAL
Qty: 120 TABLET | Refills: 1 | Status: SHIPPED | OUTPATIENT
Start: 2020-06-12 | End: 2020-08-19

## 2020-06-12 NOTE — TELEPHONE ENCOUNTER
Received refill request from Gavin for mesalamine.   mesalamin 1.2gm take 4 tabs po daily #360 with one refill .  Script escribed with note advising pt needs to make appt.

## 2020-07-06 ENCOUNTER — APPOINTMENT (OUTPATIENT)
Dept: WOMENS IMAGING | Facility: HOSPITAL | Age: 45
End: 2020-07-06

## 2020-07-06 PROCEDURE — 77065 DX MAMMO INCL CAD UNI: CPT | Performed by: RADIOLOGY

## 2020-07-06 PROCEDURE — 76641 ULTRASOUND BREAST COMPLETE: CPT | Performed by: RADIOLOGY

## 2020-07-06 PROCEDURE — 77061 BREAST TOMOSYNTHESIS UNI: CPT | Performed by: RADIOLOGY

## 2020-07-06 PROCEDURE — G0279 TOMOSYNTHESIS, MAMMO: HCPCS | Performed by: RADIOLOGY

## 2020-07-08 ENCOUNTER — TELEPHONE (OUTPATIENT)
Dept: OBSTETRICS AND GYNECOLOGY | Facility: CLINIC | Age: 45
End: 2020-07-08

## 2020-07-08 DIAGNOSIS — N64.89 BREAST ASYMMETRY: ICD-10-CM

## 2020-07-08 DIAGNOSIS — R92.8 ABNORMAL MAMMOGRAM: Primary | ICD-10-CM

## 2020-07-08 DIAGNOSIS — R92.8 ABNORMAL MAMMOGRAM: ICD-10-CM

## 2020-07-08 NOTE — TELEPHONE ENCOUNTER
Pt would like to discuss with you the findings from recent DX Mammo & US.  She is aware of 6 mth follow up.   SR

## 2020-07-24 ENCOUNTER — OFFICE VISIT (OUTPATIENT)
Dept: MAMMOGRAPHY | Facility: CLINIC | Age: 45
End: 2020-07-24

## 2020-07-24 VITALS
WEIGHT: 151 LBS | BODY MASS INDEX: 31.7 KG/M2 | HEIGHT: 58 IN | DIASTOLIC BLOOD PRESSURE: 80 MMHG | SYSTOLIC BLOOD PRESSURE: 130 MMHG

## 2020-07-24 DIAGNOSIS — R92.8 ABNORMAL MAMMOGRAM: Primary | ICD-10-CM

## 2020-07-24 PROCEDURE — 99204 OFFICE O/P NEW MOD 45 MIN: CPT | Performed by: SURGERY

## 2020-07-24 RX ORDER — MONTELUKAST SODIUM 10 MG/1
10 TABLET ORAL EVERY MORNING
COMMUNITY
Start: 2020-06-18

## 2020-07-24 NOTE — PROGRESS NOTES
Chief Complaint: Marah Britt is a 44 y.o.. female here today for Breast Mass (Left breast)        History of Present Illness:  Patient presents with breast mass. Left breast mass.   She is a nice 44-year-old white female who was recently undergoing screening mammography.  She was noted to have a focal asymmetry in the left breast in the superior aspect.  Diagnostic mammograms were performed and she was noted to have a small oval mass measuring 5 mm in the posterior one third of the left breast in the superior region about 6 cm from the nipple.  An ultrasound was performed and there was no ultrasound correlate.  This was felt to be a probably benign lesion and a six-month follow-up was recommended.  She is here to evaluate this problem.    I have personally reviewed the imaging studies.  The focal asymmetry is almost isodense to the surrounding tissue and has well-circumscribed borders.  These would appear to be generally benign features and there is no ultrasound finding to account for this.  Her family history is significant for a great aunt with breast cancer.    Review of Systems:  Review of Systems   Skin:        The patient denies any noticeable changes to the skin of the breast.    All other systems reviewed and are negative.     I have reviewed the ROS as documented by the MA/LPN/RN Freddy Grajeda MD      Past Medical and Surgical History:  Breast Biopsy History:  Patient has not had a breast biopsy in the past.  Breast Cancer HIstory:  Patient does not have a past medical history of breast cancer.  Breast Operations, and year:  Implants 2000  Social History     Tobacco Use   Smoking Status Never Smoker   Smokeless Tobacco Never Used     Obstetric History:  Patient is premenopausal, first day of last period: Does not have a period on her birth control pill.   Number of pregnancies:1  Number of live births: 1  Number of abortions or miscarriages: 0  Age of delivery of first child: 31  Patient breast  fed, for the following lenth of time:1 month  Length of time taking birth control pills:Unknown  Patient has never taken hormone replacement    Past Surgical History:   Procedure Laterality Date   • AUGMENTATION MAMMAPLASTY     • BREAST AUGMENTATION     •  SECTION     • COLONOSCOPY  2015    left sided ulcerative colitis   • COLONOSCOPY  2011    Probable ulcerative proctitis? with some inflammation around the appendiceal orifice   • COLONOSCOPY N/A 10/22/2018    Normal   • UPPER GASTROINTESTINAL ENDOSCOPY  2015    gastritis   • WISDOM TOOTH EXTRACTION         Past Medical History:   Diagnosis Date   • Acne    • ADHD    • Ulcerative colitis (CMS/HCC)        Prior Hospitalizations, other than for surgery or childbirth, and year:  None    Social History:  Patient is .  Patient has one sons.    Family History:  Family History   Problem Relation Age of Onset   • Pancreatic cancer Maternal Grandmother    • Melanoma Sister    • Prostate cancer Maternal Uncle    • Melanoma Maternal Grandfather        Vital Signs:  Vitals:    20 1357   BP: 130/80       Medications:    Current Outpatient Prescriptions:     Current Outpatient Medications:   •  montelukast (SINGULAIR) 10 MG tablet, TAKE 1 TABLET BY MOUTH EVERY NIGHT, Disp: , Rfl:   •  amphetamine-dextroamphetamine (ADDERALL) 5 MG tablet, Take 5 mg by mouth., Disp: , Rfl:   •  BIOTIN PO, Take  by mouth., Disp: , Rfl:   •  cetirizine (zyrTEC) 10 MG tablet, Take 10 mg by mouth Daily., Disp: , Rfl:   •  mesalamine (Lialda) 1.2 g EC tablet, Take 4 tabs by mouth daily, Disp: 120 tablet, Rfl: 1  •  Norethin-Eth Estrad-Fe Biphas (LO LOESTRIN FE) 1 MG-10 MCG / 10 MCG tablet, Take 1 tablet by mouth Daily. Take pill daily for suppression, Disp: 84 tablet, Rfl: 0  •  PROBIOTIC PRODUCT PO, Take  by mouth., Disp: , Rfl:   •  spironolactone (ALDACTONE) 25 MG tablet, TK 1 TO 2 TS PO D, Disp: , Rfl: 0  •  ST JOHNS WORT PO, Take 1 tablet by mouth Daily.,  Disp: , Rfl:   •  VITAMIN D PO, Take  by mouth Daily., Disp: , Rfl:     Physical Examination:  General Appearance:   Patient is in no distress.  She is well kept and has an overweight build.   Psychiatric:  Patient with appropriate mood and affect. Alert and oriented to self, time, and place.    Breast, RIGHT:  medium sized, symmetric with the contralateral side.  Breast skin is without erythema, edema, rashes.  There are no visible abnormalities upon inspection during the arm-raising maneuver or with hands on hips in the sitting position. There is no nipple retraction, discharge or nipple/areolar skin changes.There are no masses palpable in the sitting or supine positions.  She appears to have intact implants in place  Breast, LEFT:  medium sized, symmetric with the contralateral side.  Breast skin is without erythema, edema, rashes.  There are no visible abnormalities upon inspection during the arm-raising maneuver or with hands on hips in the sitting position. There is no nipple retraction, discharge or nipple/areolar skin changes.There are no masses palpable in the sitting or supine positions.  She appears to have an intact implant in place.    Lymphatic:  There is no axillary, cervical, infraclavicular, or supraclavicular adenopathy bilaterally.  Eyes:  Pupils are round and reactive to light.  Cardiovascular:  Heart rate and rhythm are regular.  Respiratory:  Lungs are clear bilaterally with no crackles or wheezes in any lung field.  Gastrointestinal:  Abdomen is soft, nondistended, and nontender.  There was no obvious hepatosplenomegaly or abdominal mass.  There are  scars from previous surgery.    Musculoskeletal:  Good strength in all 4 extremities.   There is good range of motion in both shoulders.    Skin:  No new skin lesions or rashes on the skin excluding the breast (see breast exam above).    Assessment:  1. Abnormal mammogram          Plan:  Have reviewed her imaging studies and performed a physical  examination, I am not terribly concerned by this process.  I explained to her that a BI-RADS 3 probably benign diagnosis means there is a less than 4% chance of malignancy. I do think she needs a six-month follow-up and that has already been scheduled.  The patient will continue to monitor her breasts and call if she feels anything unusual.      CPT coding:    Next Appointment:  No follow-ups on file.            EMR Dragon/transcription disclaimer:    Much of this encounter note is an electronic transcription/translocation of spoken language to printed text.  The electronic translation of spoken language may permit erroneous, or at times, nonsensical words or phrases to be inadvertently transcribed.  Although I have reviewed the note from such areas, some may still exist.

## 2020-08-13 ENCOUNTER — PRIOR AUTHORIZATION (OUTPATIENT)
Dept: GASTROENTEROLOGY | Facility: CLINIC | Age: 45
End: 2020-08-13

## 2020-08-19 RX ORDER — MESALAMINE 1.2 G/1
TABLET, DELAYED RELEASE ORAL
Qty: 120 TABLET | Refills: 1 | Status: SHIPPED | OUTPATIENT
Start: 2020-08-19 | End: 2020-10-19 | Stop reason: SDUPTHER

## 2020-08-19 NOTE — TELEPHONE ENCOUNTER
Maycol request received for lialda 1.2 g - take 4 tabs by mouth daily, #120, R1.    Pt last seen 2/3/20 - was to return in 3 mo's. One appt cancelled by this office.  2 appt's cancelled by pt.  Message to ASHLIE Lucero.

## 2020-08-31 NOTE — TELEPHONE ENCOUNTER
Mesalamine has been denied-[employer] has suggested a peer to peer for reconsideration to be scanned into [employer]      Message sent to Meghann DAVID.

## 2020-09-14 NOTE — PROGRESS NOTES
Subjective    Chief Complaint   Patient presents with   • Follow-up     Repeat pap       History of Present Illness    Marah Britt is a 44 y.o. female who presents for annual exam.  Patient had colposcopy with biopsy last visit for atypical Pap which was negative for dysplasia.  Non-smoker.  Mammogram being scheduled.  Patient on low Loestrin and wants to stay on it.  Has history of ulcerative colitis with colonoscopies every 2 years.    Obstetric History:  OB History        1    Para        Term                AB        Living           SAB        TAB        Ectopic        Molar        Multiple        Live Births   1               Menstrual History:     No LMP recorded.       Past Medical History:   Diagnosis Date   • Acne    • ADHD    • Ulcerative colitis (CMS/HCC)      Family History   Problem Relation Age of Onset   • Pancreatic cancer Maternal Grandmother      Social History     Tobacco Use   Smoking Status Never Smoker   Smokeless Tobacco Never Used             Review of Systems   Constitutional: Negative.  Negative for fever and unexpected weight change.   HENT: Negative.    Respiratory: Negative for shortness of breath and wheezing.    Cardiovascular: Negative for chest pain, palpitations and leg swelling.   Gastrointestinal: Negative for abdominal pain, anal bleeding and blood in stool.   Genitourinary: Negative for dysuria, pelvic pain, urgency, vaginal bleeding, vaginal discharge and vaginal pain.   Skin: Negative.    Neurological: Negative.    Hematological: Negative.  Negative for adenopathy.   Psychiatric/Behavioral: Negative.  Negative for dysphoric mood. The patient is not nervous/anxious.             Objective   Physical Exam   Constitutional: She is oriented to person, place, and time. Vital signs are normal. She appears well-developed and well-nourished.   HENT:   Head: Normocephalic.   Neck: Trachea normal. No tracheal deviation present. No thyromegaly present.   Cardiovascular:  "Normal rate, regular rhythm and normal heart sounds.   No murmur heard.  Pulmonary/Chest: Effort normal and breath sounds normal.   Breasts without masses, tenderness or nipple discharge   Abdominal: Soft. Normal appearance. She exhibits no mass. There is no hepatosplenomegaly. There is no tenderness. No hernia.   Genitourinary: Rectum normal, vagina normal and uterus normal. Uterus is not enlarged and not tender. Cervix exhibits no motion tenderness. Right adnexum displays no mass and no tenderness. Left adnexum displays no mass and no tenderness. No vaginal discharge found.   Genitourinary Comments: External genitalia normal    Lymphadenopathy:     She has no cervical adenopathy.     She has no axillary adenopathy.   Neurological: She is alert and oriented to person, place, and time.   Skin: Skin is warm and dry. No rash noted.   Psychiatric: She has a normal mood and affect. Her behavior is normal. Cognition and memory are normal.       /80   Ht 151.1 cm (59.5\")   Wt 66.2 kg (146 lb)   BMI 28.99 kg/m²     Assessment/Plan   Marah was seen today for follow-up.    Diagnoses and all orders for this visit:    Encounter for gynecological examination without abnormal finding  -     IGP,rfx Aptima HPV All Pth    Screening for breast cancer    History of abnormal cervical Pap smear  -     IGP,rfx Aptima HPV All Pth    Hx of thyroid disease  -     Thyroid Panel With TSH    Other orders  -     Norethin-Eth Estrad-Fe Biphas (Lo Loestrin Fe) 1 MG-10 MCG / 10 MCG tablet; Take 1 tablet by mouth Daily.        Mammogram.  Return to office 1 year.  Counseled about beginning calcium with vitamin D twice daily.  Due to questionable history of thyroid disease in the past followed by endocrinologist Dr. Devlin, will check thyroid function test.             " Please call your doctor if you have fever or chills within the next 48 hours.

## 2020-10-19 ENCOUNTER — TELEPHONE (OUTPATIENT)
Dept: GASTROENTEROLOGY | Facility: CLINIC | Age: 45
End: 2020-10-19

## 2020-10-19 RX ORDER — MESALAMINE 1.2 G/1
TABLET, DELAYED RELEASE ORAL
Qty: 120 TABLET | Refills: 1 | Status: SHIPPED | OUTPATIENT
Start: 2020-10-19 | End: 2020-12-21

## 2020-10-19 NOTE — TELEPHONE ENCOUNTER
Message sent to Meghann Np regarding refilling of mesalamine.    Michelle Torres  [phone]  37w5d        Michelle Torres presents for labor evaluation. States jaxon since 10 am, now every 7-10 minutes. Denies rupture or bleeding. Was here Friday night and was tested positive for UTI, has been taking Macrobid since.    FHT: 140  NST: started  UA: collected and sent to lab    Plan:  Sterile vaginal exam  Will contact MD for further orders

## 2020-10-19 NOTE — TELEPHONE ENCOUNTER
----- Message from Chetan Lux sent at 10/19/2020  8:05 AM EDT -----  Regarding: Refill request for mesalamine (LIALDA) 1.2 g EC tablet  Pt called about refill, has pharmacy is waiting for approval.        mesalamine (LIALDA) 1.2 g EC tablet 120 tablet 1 8/19/2020    Sig: TAKE 4 TABLETS BY MOUTH DAILY   Sent to pharmacy as: Mesalamine 1.2 GM Oral Tablet Delayed Release (LIALDA)   E-Prescribing Status: Receipt confirmed by pharmacy (8/19/2020  3:49 PM EDT)   Pharmacy    [id number] - [facility] [address] [facility] [address] [facility] [phone] [facility] [phone]

## 2020-10-30 ENCOUNTER — OFFICE VISIT (OUTPATIENT)
Dept: OBSTETRICS AND GYNECOLOGY | Facility: CLINIC | Age: 45
End: 2020-10-30

## 2020-10-30 VITALS
HEIGHT: 59 IN | WEIGHT: 145 LBS | BODY MASS INDEX: 29.23 KG/M2 | SYSTOLIC BLOOD PRESSURE: 136 MMHG | DIASTOLIC BLOOD PRESSURE: 86 MMHG

## 2020-10-30 DIAGNOSIS — N89.8 VAGINAL LESION: ICD-10-CM

## 2020-10-30 DIAGNOSIS — Z11.3 SCREENING FOR STD (SEXUALLY TRANSMITTED DISEASE): Primary | ICD-10-CM

## 2020-10-30 PROCEDURE — 99213 OFFICE O/P EST LOW 20 MIN: CPT | Performed by: NURSE PRACTITIONER

## 2020-10-30 RX ORDER — DEXTROAMPHETAMINE SACCHARATE, AMPHETAMINE ASPARTATE MONOHYDRATE, DEXTROAMPHETAMINE SULFATE AND AMPHETAMINE SULFATE 2.5; 2.5; 2.5; 2.5 MG/1; MG/1; MG/1; MG/1
10 CAPSULE, EXTENDED RELEASE ORAL EVERY MORNING
COMMUNITY
Start: 2020-10-26

## 2020-10-30 RX ORDER — FLUCONAZOLE 150 MG/1
150 TABLET ORAL DAILY
Qty: 1 TABLET | Refills: 2 | Status: SHIPPED | OUTPATIENT
Start: 2020-10-30 | End: 2021-05-10

## 2020-10-30 NOTE — PROGRESS NOTES
Chief Complaint   Patient presents with   • Exposure to STD     Pt needs STD testing.         SUBJECTIVE:     Marah Britt is a 45 y.o.  who presents requesting STD testing. Recent unprotected sex with a new partner. No known exposure. Denies vaginal discharge, itching, or odor. Recently treated for UTI with macrobid. Denies current dysuria, suprapubic or low back pain.  Recently treated with flagyl for suspected BV. Reports STD testing at that time was normal.     C/o vaginal lesion for 2- 3 weeks, denies pain, itching, tingling, burning, purulent material or drainage from lesion. She first noticed while showering. At that time she attempted to remove by scraping with finger nails, no material was expressed. She feels as if this lesion has gotten smaller since this time.     This is my first time meeting Marah Britt      Past Medical History:   Diagnosis Date   • Acne    • ADHD    • Ulcerative colitis (CMS/HCC)       Past Surgical History:   Procedure Laterality Date   • AUGMENTATION MAMMAPLASTY     • BREAST AUGMENTATION     •  SECTION     • COLONOSCOPY  2015    left sided ulcerative colitis   • COLONOSCOPY  2011    Probable ulcerative proctitis? with some inflammation around the appendiceal orifice   • COLONOSCOPY N/A 10/22/2018    Normal   • UPPER GASTROINTESTINAL ENDOSCOPY  2015    gastritis   • WISDOM TOOTH EXTRACTION        Social History     Tobacco Use   • Smoking status: Never Smoker   • Smokeless tobacco: Never Used   Substance Use Topics   • Alcohol use: Yes     Comment: rare    • Drug use: No     OB History    Para Term  AB Living   1             SAB TAB Ectopic Molar Multiple Live Births             1      # Outcome Date GA Lbr Kvng/2nd Weight Sex Delivery Anes PTL Lv   1                  Review of Systems   Constitutional: Negative for chills, fatigue and fever.   Gastrointestinal: Negative for abdominal distention and abdominal pain.  "  Genitourinary: Positive for genital sores. Negative for dyspareunia, dysuria, flank pain, frequency, hematuria, menstrual problem, pelvic pain, urgency, vaginal bleeding, vaginal discharge and vaginal pain.   Musculoskeletal: Negative for gait problem.   Skin: Negative for color change and wound.   Neurological: Negative for dizziness and headaches.   Psychiatric/Behavioral: Negative for behavioral problems.       OBJECTIVE:   Vitals:    10/30/20 1201   BP: 136/86   Weight: 65.8 kg (145 lb)   Height: 149.9 cm (59\")        Physical Exam  Vitals signs and nursing note reviewed.   Constitutional:       Appearance: Normal appearance.   HENT:      Head: Normocephalic and atraumatic.   Neck:      Musculoskeletal: Normal range of motion.   Cardiovascular:      Rate and Rhythm: Normal rate.   Pulmonary:      Effort: Pulmonary effort is normal.   Abdominal:      General: There is no distension.      Palpations: Abdomen is soft. There is no mass.      Tenderness: There is no abdominal tenderness. There is no guarding or rebound.      Hernia: No hernia is present. There is no hernia in the left inguinal area or right inguinal area.   Genitourinary:     Pubic Area: No rash or pubic lice.       Labia:         Right: Lesion present. No rash, tenderness or injury.         Left: No rash, tenderness, lesion or injury.       Urethra: No prolapse, urethral pain, urethral swelling or urethral lesion.      Vagina: No signs of injury and foreign body. Vaginal discharge (thick, white, adhered to vaginal walls) present. No erythema, tenderness, bleeding, lesions or prolapsed vaginal walls.      Cervix: No cervical motion tenderness, discharge, friability, lesion, erythema, cervical bleeding or eversion.      Uterus: Not deviated, not enlarged, not fixed, not tender and no uterine prolapse.       Adnexa:         Right: No mass, tenderness or fullness.          Left: No mass, tenderness or fullness.         Musculoskeletal: Normal range of " motion.   Lymphadenopathy:      Lower Body: No right inguinal adenopathy. No left inguinal adenopathy.   Skin:     General: Skin is warm and dry.   Neurological:      General: No focal deficit present.      Mental Status: She is alert and oriented to person, place, and time.      Cranial Nerves: No cranial nerve deficit.   Psychiatric:         Mood and Affect: Mood normal.         Behavior: Behavior normal.         Thought Content: Thought content normal.         Judgment: Judgment normal.         ASSESSMENT:   1) Screening for STD  2) Vaginal lesion    PLAN:   NuSwab+ today  HIV, RPR, Hepatitis today  Vaginal discharge consistent with yeast, Diflucan sent to pharmacy   Vaginal lesion consistent with inclusion cyst, encouraged to monitor for changes  Encouraged condoms to prevent STD    Follow up:      MELISA Santiago  10/30/2020  12:12 EDT

## 2020-10-31 LAB
HAV IGM SERPL QL IA: NEGATIVE
HBV CORE IGM SERPL QL IA: NEGATIVE
HBV SURFACE AG SERPL QL IA: NEGATIVE
HCV AB S/CO SERPL IA: <0.1 S/CO RATIO (ref 0–0.9)
HIV 1+2 AB+HIV1 P24 AG SERPL QL IA: NON REACTIVE
RPR SER QL: NORMAL

## 2020-11-02 LAB
A VAGINAE DNA VAG QL NAA+PROBE: NORMAL SCORE
BVAB2 DNA VAG QL NAA+PROBE: NORMAL SCORE
C ALBICANS DNA VAG QL NAA+PROBE: NEGATIVE
C GLABRATA DNA VAG QL NAA+PROBE: NEGATIVE
C TRACH DNA VAG QL NAA+PROBE: NEGATIVE
MEGA1 DNA VAG QL NAA+PROBE: NORMAL SCORE
N GONORRHOEA DNA VAG QL NAA+PROBE: NEGATIVE
T VAGINALIS DNA VAG QL NAA+PROBE: NEGATIVE

## 2020-12-21 RX ORDER — MESALAMINE 1.2 G/1
TABLET, DELAYED RELEASE ORAL
Qty: 120 TABLET | Refills: 1 | Status: SHIPPED | OUTPATIENT
Start: 2020-12-21 | End: 2021-02-19

## 2021-01-06 ENCOUNTER — TELEPHONE (OUTPATIENT)
Dept: GASTROENTEROLOGY | Facility: CLINIC | Age: 46
End: 2021-01-06

## 2021-01-11 ENCOUNTER — APPOINTMENT (OUTPATIENT)
Dept: WOMENS IMAGING | Facility: HOSPITAL | Age: 46
End: 2021-01-11

## 2021-01-11 PROCEDURE — 76641 ULTRASOUND BREAST COMPLETE: CPT | Performed by: RADIOLOGY

## 2021-01-11 PROCEDURE — 77065 DX MAMMO INCL CAD UNI: CPT | Performed by: RADIOLOGY

## 2021-01-11 PROCEDURE — 77061 BREAST TOMOSYNTHESIS UNI: CPT | Performed by: RADIOLOGY

## 2021-01-11 PROCEDURE — G0279 TOMOSYNTHESIS, MAMMO: HCPCS | Performed by: RADIOLOGY

## 2021-01-12 ENCOUNTER — TELEPHONE (OUTPATIENT)
Dept: OBSTETRICS AND GYNECOLOGY | Facility: CLINIC | Age: 46
End: 2021-01-12

## 2021-01-12 DIAGNOSIS — N63.20 LEFT BREAST MASS: ICD-10-CM

## 2021-01-12 NOTE — TELEPHONE ENCOUNTER
Pt has been notified of stable findings in recent DX Mammo & US.  She will return in 6 mths, June 2021 for DX Bilat Mammo.   I have her on file to follow as well.  SR

## 2021-01-14 ENCOUNTER — TELEPHONE (OUTPATIENT)
Dept: GASTROENTEROLOGY | Facility: CLINIC | Age: 46
End: 2021-01-14

## 2021-01-14 NOTE — TELEPHONE ENCOUNTER
Last scope 06/16/2015-- personal hx of polyps-- family hx of polyps-- no ASA or blood thinners-- medications:     amphetamine-dextroamphetamine XR (ADDERALL XR) 10 MG 24 hr capsule     BIOTIN PO     mesalamine (LIALDA) 1.2 g EC tablet     montelukast (SINGULAIR) 10 MG tablet     Norethin-Eth Estrad-Fe Biphas (LO LOESTRIN FE) 1 MG-10 MCG / 10 MCG tablet     spironolactone (ALDACTONE) 25 MG tablet     ST JOHNS WORT PO     VITAMIN D PO      OA form and last scope scanned into media

## 2021-01-17 ENCOUNTER — PREP FOR SURGERY (OUTPATIENT)
Dept: OTHER | Facility: HOSPITAL | Age: 46
End: 2021-01-17

## 2021-01-17 DIAGNOSIS — K51.90 UC (ULCERATIVE COLITIS) (HCC): Primary | ICD-10-CM

## 2021-01-17 DIAGNOSIS — K63.5 COLON POLYP: ICD-10-CM

## 2021-01-17 DIAGNOSIS — Z83.71 FH: COLON POLYPS: ICD-10-CM

## 2021-01-20 PROBLEM — Z83.71 FH: COLON POLYPS: Status: ACTIVE | Noted: 2021-01-20

## 2021-01-20 PROBLEM — K51.90 UC (ULCERATIVE COLITIS): Status: ACTIVE | Noted: 2021-01-20

## 2021-01-20 PROBLEM — Z83.719 FH: COLON POLYPS: Status: ACTIVE | Noted: 2021-01-20

## 2021-01-20 PROBLEM — K63.5 COLON POLYP: Status: ACTIVE | Noted: 2021-01-20

## 2021-02-08 ENCOUNTER — TELEPHONE (OUTPATIENT)
Dept: OBSTETRICS AND GYNECOLOGY | Facility: CLINIC | Age: 46
End: 2021-02-08

## 2021-02-08 NOTE — TELEPHONE ENCOUNTER
Elise this states that patient needs a PA.  You can do that or ask her if there is another low-dose OCP that is covered by her insurance.  She may need to check with her pharmacy.  ARLEY

## 2021-02-08 NOTE — TELEPHONE ENCOUNTER
Patient called and needs a PA on her Norethin-Eth Estrad-Fe Biphas (LO LOESTRIN FE) 1 MG-10 MCG / 10 MCG tablet     Please Advise,  Thanks Ana Luisa

## 2021-02-19 RX ORDER — MESALAMINE 1.2 G/1
TABLET, DELAYED RELEASE ORAL
Qty: 120 TABLET | Refills: 1 | Status: SHIPPED | OUTPATIENT
Start: 2021-02-19 | End: 2022-03-17 | Stop reason: SDUPTHER

## 2021-03-08 ENCOUNTER — TRANSCRIBE ORDERS (OUTPATIENT)
Dept: SLEEP MEDICINE | Facility: HOSPITAL | Age: 46
End: 2021-03-08

## 2021-03-08 DIAGNOSIS — Z01.818 OTHER SPECIFIED PRE-OPERATIVE EXAMINATION: Primary | ICD-10-CM

## 2021-03-12 ENCOUNTER — LAB (OUTPATIENT)
Dept: LAB | Facility: HOSPITAL | Age: 46
End: 2021-03-12

## 2021-03-12 DIAGNOSIS — Z01.818 OTHER SPECIFIED PRE-OPERATIVE EXAMINATION: ICD-10-CM

## 2021-03-12 PROCEDURE — U0004 COV-19 TEST NON-CDC HGH THRU: HCPCS

## 2021-03-12 PROCEDURE — C9803 HOPD COVID-19 SPEC COLLECT: HCPCS

## 2021-03-13 LAB — SARS-COV-2 ORF1AB RESP QL NAA+PROBE: NOT DETECTED

## 2021-03-15 ENCOUNTER — ANESTHESIA (OUTPATIENT)
Dept: GASTROENTEROLOGY | Facility: HOSPITAL | Age: 46
End: 2021-03-15

## 2021-03-15 ENCOUNTER — HOSPITAL ENCOUNTER (OUTPATIENT)
Facility: HOSPITAL | Age: 46
Setting detail: HOSPITAL OUTPATIENT SURGERY
Discharge: HOME | End: 2021-03-15
Attending: INTERNAL MEDICINE | Admitting: INTERNAL MEDICINE

## 2021-03-15 ENCOUNTER — ANESTHESIA EVENT (OUTPATIENT)
Dept: GASTROENTEROLOGY | Facility: HOSPITAL | Age: 46
End: 2021-03-15

## 2021-03-15 VITALS
RESPIRATION RATE: 14 BRPM | SYSTOLIC BLOOD PRESSURE: 111 MMHG | WEIGHT: 129.56 LBS | BODY MASS INDEX: 25.44 KG/M2 | OXYGEN SATURATION: 100 % | DIASTOLIC BLOOD PRESSURE: 80 MMHG | HEART RATE: 77 BPM | HEIGHT: 60 IN

## 2021-03-15 DIAGNOSIS — K51.90 UC (ULCERATIVE COLITIS) (HCC): ICD-10-CM

## 2021-03-15 DIAGNOSIS — K63.5 COLON POLYP: ICD-10-CM

## 2021-03-15 DIAGNOSIS — Z83.71 FH: COLON POLYPS: ICD-10-CM

## 2021-03-15 LAB
ALBUMIN SERPL-MCNC: 3.8 G/DL (ref 3.5–5.2)
ALBUMIN/GLOB SERPL: 1.5 G/DL
ALP SERPL-CCNC: 47 U/L (ref 39–117)
ALT SERPL W P-5'-P-CCNC: 9 U/L (ref 1–33)
ANION GAP SERPL CALCULATED.3IONS-SCNC: 8.4 MMOL/L (ref 5–15)
AST SERPL-CCNC: 11 U/L (ref 1–32)
B-HCG UR QL: NEGATIVE
BASOPHILS # BLD AUTO: 0.02 10*3/MM3 (ref 0–0.2)
BASOPHILS NFR BLD AUTO: 0.3 % (ref 0–1.5)
BILIRUB SERPL-MCNC: 0.6 MG/DL (ref 0–1.2)
BUN SERPL-MCNC: 8 MG/DL (ref 6–20)
BUN/CREAT SERPL: 10.5 (ref 7–25)
CALCIUM SPEC-SCNC: 8.7 MG/DL (ref 8.6–10.5)
CHLORIDE SERPL-SCNC: 104 MMOL/L (ref 98–107)
CO2 SERPL-SCNC: 25.6 MMOL/L (ref 22–29)
CREAT SERPL-MCNC: 0.76 MG/DL (ref 0.57–1)
DEPRECATED RDW RBC AUTO: 42.5 FL (ref 37–54)
EOSINOPHIL # BLD AUTO: 0.02 10*3/MM3 (ref 0–0.4)
EOSINOPHIL NFR BLD AUTO: 0.3 % (ref 0.3–6.2)
ERYTHROCYTE [DISTWIDTH] IN BLOOD BY AUTOMATED COUNT: 12.3 % (ref 12.3–15.4)
GFR SERPL CREATININE-BSD FRML MDRD: 82 ML/MIN/1.73
GLOBULIN UR ELPH-MCNC: 2.6 GM/DL
GLUCOSE SERPL-MCNC: 82 MG/DL (ref 65–99)
HCT VFR BLD AUTO: 40.4 % (ref 34–46.6)
HGB BLD-MCNC: 13.3 G/DL (ref 12–15.9)
IMM GRANULOCYTES # BLD AUTO: 0.01 10*3/MM3 (ref 0–0.05)
IMM GRANULOCYTES NFR BLD AUTO: 0.1 % (ref 0–0.5)
INTERNAL NEGATIVE CONTROL: NEGATIVE
INTERNAL POSITIVE CONTROL: POSITIVE
LYMPHOCYTES # BLD AUTO: 2.25 10*3/MM3 (ref 0.7–3.1)
LYMPHOCYTES NFR BLD AUTO: 31.8 % (ref 19.6–45.3)
Lab: NORMAL
MCH RBC QN AUTO: 31 PG (ref 26.6–33)
MCHC RBC AUTO-ENTMCNC: 32.9 G/DL (ref 31.5–35.7)
MCV RBC AUTO: 94.2 FL (ref 79–97)
MONOCYTES # BLD AUTO: 0.48 10*3/MM3 (ref 0.1–0.9)
MONOCYTES NFR BLD AUTO: 6.8 % (ref 5–12)
NEUTROPHILS NFR BLD AUTO: 4.29 10*3/MM3 (ref 1.7–7)
NEUTROPHILS NFR BLD AUTO: 60.7 % (ref 42.7–76)
NRBC BLD AUTO-RTO: 0 /100 WBC (ref 0–0.2)
PLATELET # BLD AUTO: 328 10*3/MM3 (ref 140–450)
PMV BLD AUTO: 9.7 FL (ref 6–12)
POTASSIUM SERPL-SCNC: 3.5 MMOL/L (ref 3.5–5.2)
PROT SERPL-MCNC: 6.4 G/DL (ref 6–8.5)
RBC # BLD AUTO: 4.29 10*6/MM3 (ref 3.77–5.28)
SODIUM SERPL-SCNC: 138 MMOL/L (ref 136–145)
WBC # BLD AUTO: 7.07 10*3/MM3 (ref 3.4–10.8)

## 2021-03-15 PROCEDURE — 81025 URINE PREGNANCY TEST: CPT | Performed by: INTERNAL MEDICINE

## 2021-03-15 PROCEDURE — 85025 COMPLETE CBC W/AUTO DIFF WBC: CPT | Performed by: INTERNAL MEDICINE

## 2021-03-15 PROCEDURE — 25010000002 PROPOFOL 10 MG/ML EMULSION: Performed by: ANESTHESIOLOGY

## 2021-03-15 PROCEDURE — 88305 TISSUE EXAM BY PATHOLOGIST: CPT | Performed by: INTERNAL MEDICINE

## 2021-03-15 PROCEDURE — 45380 COLONOSCOPY AND BIOPSY: CPT | Performed by: INTERNAL MEDICINE

## 2021-03-15 PROCEDURE — 80053 COMPREHEN METABOLIC PANEL: CPT | Performed by: INTERNAL MEDICINE

## 2021-03-15 RX ORDER — LIDOCAINE HYDROCHLORIDE 20 MG/ML
INJECTION, SOLUTION INFILTRATION; PERINEURAL AS NEEDED
Status: DISCONTINUED | OUTPATIENT
Start: 2021-03-15 | End: 2021-03-15 | Stop reason: SURG

## 2021-03-15 RX ORDER — SODIUM CHLORIDE, SODIUM LACTATE, POTASSIUM CHLORIDE, CALCIUM CHLORIDE 600; 310; 30; 20 MG/100ML; MG/100ML; MG/100ML; MG/100ML
30 INJECTION, SOLUTION INTRAVENOUS CONTINUOUS PRN
Status: DISCONTINUED | OUTPATIENT
Start: 2021-03-15 | End: 2021-03-15 | Stop reason: HOSPADM

## 2021-03-15 RX ORDER — PROPOFOL 10 MG/ML
VIAL (ML) INTRAVENOUS AS NEEDED
Status: DISCONTINUED | OUTPATIENT
Start: 2021-03-15 | End: 2021-03-15 | Stop reason: SURG

## 2021-03-15 RX ADMIN — PROPOFOL 120 MG: 10 INJECTION, EMULSION INTRAVENOUS at 15:36

## 2021-03-15 RX ADMIN — LIDOCAINE HYDROCHLORIDE 60 MG: 20 INJECTION, SOLUTION INFILTRATION; PERINEURAL at 15:36

## 2021-03-15 RX ADMIN — PROPOFOL 20 MG: 10 INJECTION, EMULSION INTRAVENOUS at 15:42

## 2021-03-15 RX ADMIN — PROPOFOL 200 MCG/KG/MIN: 10 INJECTION, EMULSION INTRAVENOUS at 15:41

## 2021-03-15 RX ADMIN — SODIUM CHLORIDE, POTASSIUM CHLORIDE, SODIUM LACTATE AND CALCIUM CHLORIDE 30 ML/HR: 600; 310; 30; 20 INJECTION, SOLUTION INTRAVENOUS at 14:34

## 2021-03-15 NOTE — ANESTHESIA PREPROCEDURE EVALUATION
Anesthesia Evaluation     Patient summary reviewed and Nursing notes reviewed                Airway   Mallampati: II  TM distance: >3 FB  Neck ROM: full  No difficulty expected  Dental - normal exam     Pulmonary - normal exam   (+) a smoker Former,   Cardiovascular - normal exam        Neuro/Psych  (+) psychiatric history ADHD,     GI/Hepatic/Renal/Endo      ROS Comment: Hx of UC    Musculoskeletal     Abdominal  - normal exam   Substance History      OB/GYN          Other                        Anesthesia Plan    ASA 2     MAC     intravenous induction     Anesthetic plan, all risks, benefits, and alternatives have been provided, discussed and informed consent has been obtained with: patient.

## 2021-03-15 NOTE — ANESTHESIA POSTPROCEDURE EVALUATION
Patient: Marah Britt    Procedure Summary     Date: 03/15/21 Room / Location:  [facility]    Anesthesia Start: 1522 Anesthesia Stop: 1611    Procedure: COLONOSCOPY TO CECUM WITH BIOPSIES (N/A ) Diagnosis:       UC (ulcerative colitis) (CMS/HCC)      Colon polyp      FH: colon polyps      (UC (ulcerative colitis) (CMS/HCC) [K51.90])      (Colon polyp [K63.5])      (FH: colon polyps [Z83.71])    Surgeons: David Capellan MD Provider: Chauncey Joel MD    Anesthesia Type: MAC ASA Status: 2          Anesthesia Type: MAC    Vitals  No vitals data found for the desired time range.          Post Anesthesia Care and Evaluation    Patient location during evaluation: PHASE II  Patient participation: complete - patient participated  Level of consciousness: awake and alert  Pain management: adequate  Airway patency: patent  Anesthetic complications: No anesthetic complications  PONV Status: none  Cardiovascular status: acceptable and hemodynamically stable  Respiratory status: acceptable  Hydration status: acceptable

## 2021-03-15 NOTE — H&P
[facility], [place], operated by [facility]  Pre Procedure History & Physical    Chief Complaint:   Time for my colonoscopy    Subjective     HPI:   45 y.o. female with a h/o ulcerative colitis diagnosed in 2009. She is on mesalamine 4 pills/day. She last had a colonoscopy in 10/18.     Past Medical History:   Past Medical History:   Diagnosis Date   • Acne    • ADHD    • Ulcerative colitis (CMS/HCC)        Family History:  Family History   Problem Relation Age of Onset   • Pancreatic cancer Maternal Grandmother    • Melanoma Sister    • Prostate cancer Maternal Uncle    • Melanoma Maternal Grandfather        Social History:   reports that she has quit smoking. Her smoking use included cigarettes. She has never used smokeless tobacco. She reports current alcohol use. She reports that she does not use drugs.    Medications:   Medications Prior to Admission   Medication Sig Dispense Refill Last Dose   • amphetamine-dextroamphetamine XR (ADDERALL XR) 10 MG 24 hr capsule    3/15/2021 at Unknown time   • BIOTIN PO Take  by mouth.   3/14/2021 at Unknown time   • fluconazole (Diflucan) 150 MG tablet Take 1 tablet by mouth Daily. 1 tablet 2 Past Week at Unknown time   • mesalamine (Lialda) 1.2 g EC tablet TAKE 4 TABLETS BY MOUTH DAILY 120 tablet 1 3/15/2021 at Unknown time   • montelukast (SINGULAIR) 10 MG tablet TAKE 1 TABLET BY MOUTH EVERY NIGHT   3/15/2021 at Unknown time   • Norethin-Eth Estrad-Fe Biphas (LO LOESTRIN FE) 1 MG-10 MCG / 10 MCG tablet Take 1 tablet by mouth Daily. Take pill daily for suppression 84 tablet 0 3/15/2021 at Unknown time   • spironolactone (ALDACTONE) 25 MG tablet TK 1 TO 2 TS PO D  0 3/15/2021 at Unknown time   • ST JOHNS WORT PO Take 1 tablet by mouth Daily.   3/14/2021 at Unknown time   • VITAMIN D PO Take  by mouth Daily.   3/14/2021 at Unknown time       Allergies:  Patient has no known allergies.    ROS:    Pertinent items are noted in HPI     Objective     Blood pressure 106/75, pulse 80, resp.  "rate 16, height 152.4 cm (60\"), weight 58.8 kg (129 lb 9 oz), SpO2 100 %, not currently breastfeeding.    Physical Exam   Constitutional: Pt is oriented to person, place, and time and well-developed, well-nourished, and in no distress.   HENT:   Mouth/Throat: Oropharynx is clear and moist.   Neck: Normal range of motion. Neck supple.   Cardiovascular: Normal rate, regular rhythm and normal heart sounds.    Pulmonary/Chest: Effort normal and breath sounds normal. No respiratory distress. No  wheezes.   Abdominal: Soft. Bowel sounds are normal.   Skin: Skin is warm and dry.   Psychiatric: Mood, memory, affect and judgment normal.     Assessment/Plan     Diagnosis:  45 y.o. female with a h/o ulcerative colitis diagnosed in 2009. She is on mesalamine 4 pills/day. She last had a colonoscopy in 10/18.     Anticipated Surgical Procedure:  Colonoscopy    The risks, benefits, and alternatives of this procedure have been discussed with the patient or the responsible party- the patient understands and agrees to proceed.    David Capellan M.D.  "

## 2021-03-17 LAB
LAB AP CASE REPORT: NORMAL
PATH REPORT.FINAL DX SPEC: NORMAL
PATH REPORT.GROSS SPEC: NORMAL

## 2021-03-22 ENCOUNTER — APPOINTMENT (OUTPATIENT)
Dept: VACCINE CLINIC | Facility: HOSPITAL | Age: 46
End: 2021-03-22

## 2021-03-29 ENCOUNTER — TELEPHONE (OUTPATIENT)
Dept: GASTROENTEROLOGY | Facility: CLINIC | Age: 46
End: 2021-03-29

## 2021-03-29 NOTE — TELEPHONE ENCOUNTER
Called pt and advised of Dr Capellan's note.  Pt verb understanding.     Pt reports that Dr Capellan mentioned to her mother that she looked so good that she could possibly decrease her lialda from 4 a day to 2 a day.  Advised will send message to Dr Capellan.   Verb understanding.     C/s placed in recall and  for 03/15/2023.    Results sent to Dr Valero thru [facility].

## 2021-03-29 NOTE — PROGRESS NOTES
03/28/21  Tell her that her labs looked good.        Pathology from her recent colonoscopy showed that her ulcerative colitis is fairly quiet. I would continue taking the Mesalamine. I would repeat colonoscopy in 2 yrs because of the long h/o ulcerative colitis. FAX to her PCP.   Fernandez agosto

## 2021-03-29 NOTE — TELEPHONE ENCOUNTER
Why do not she have the patient decrease the Lialda from 4/day down to 3 p.o. daily.  I would then have her come see me in the office in 6 months and will see how she is doing at that time.

## 2021-03-31 NOTE — TELEPHONE ENCOUNTER
Call to pt.  Advise per Dr Capellan note. Verb understanding.     F/u appt scheduled with DR Capellan for 9/13 @ 8:30 am.

## 2021-04-02 ENCOUNTER — BULK ORDERING (OUTPATIENT)
Dept: CASE MANAGEMENT | Facility: OTHER | Age: 46
End: 2021-04-02

## 2021-04-02 DIAGNOSIS — Z23 IMMUNIZATION DUE: ICD-10-CM

## 2021-04-19 ENCOUNTER — APPOINTMENT (OUTPATIENT)
Dept: VACCINE CLINIC | Facility: HOSPITAL | Age: 46
End: 2021-04-19

## 2021-04-23 ENCOUNTER — TELEPHONE (OUTPATIENT)
Dept: GASTROENTEROLOGY | Facility: CLINIC | Age: 46
End: 2021-04-23

## 2021-04-23 RX ORDER — MESALAMINE 1.2 G/1
TABLET, DELAYED RELEASE ORAL
Qty: 120 TABLET | Refills: 3 | Status: SHIPPED | OUTPATIENT
Start: 2021-04-23 | End: 2021-08-19

## 2021-04-23 NOTE — TELEPHONE ENCOUNTER
Called pt and pt reports that her father became ill on Monday and was taken to the ER and was diagnosed with cdiff. Pt states she touched her face to his face and also used the same bathroom as him and is very worried about having cdiff. Pt is asking if she should be tested.  Pt states that her stools are prefectly normal now.  Also pt reports that Dr Capellan recently decreased her to 3 lialda a day but told her she go back to 4 a day if desired.  Pt would like to go back to 4 per day and needs a new script.  Advised will send message to Meghann DAVID.

## 2021-04-23 NOTE — TELEPHONE ENCOUNTER
----- Message from Chetan Renteria sent at 4/23/2021  8:41 AM EDT -----  Regarding: Questions  Contact: [phone]  PT would like to discuss having having some tests done , please call pt

## 2021-04-23 NOTE — TELEPHONE ENCOUNTER
No it is highly unlikely that she would contracted C. difficile unless she was actually playing in his stool.  Even then a very healthy young person with a good immune system should not easily catch C. difficile.  However I think it would be metz for her to go ahead and take the Lialda 4 tabs daily for right now.  There is no point in testing her stool if she is not having diarrhea.  She might also think about starting a good daily probiotic supplement which would also give her more protection.

## 2021-04-26 RX ORDER — NORETHINDRONE ACETATE AND ETHINYL ESTRADIOL, ETHINYL ESTRADIOL AND FERROUS FUMARATE 1MG-10(24)
KIT ORAL
Qty: 84 TABLET | Refills: 0 | Status: SHIPPED | OUTPATIENT
Start: 2021-04-26 | End: 2021-05-10 | Stop reason: SDUPTHER

## 2021-05-07 ENCOUNTER — TELEPHONE (OUTPATIENT)
Dept: OBSTETRICS AND GYNECOLOGY | Facility: CLINIC | Age: 46
End: 2021-05-07

## 2021-05-07 NOTE — TELEPHONE ENCOUNTER
Contacted patient about 6 month FU at [facility]. Patient is going to call to schedule today.

## 2021-05-10 ENCOUNTER — OFFICE VISIT (OUTPATIENT)
Dept: OBSTETRICS AND GYNECOLOGY | Facility: CLINIC | Age: 46
End: 2021-05-10

## 2021-05-10 VITALS
DIASTOLIC BLOOD PRESSURE: 86 MMHG | SYSTOLIC BLOOD PRESSURE: 117 MMHG | HEIGHT: 60 IN | WEIGHT: 135 LBS | BODY MASS INDEX: 26.5 KG/M2 | HEART RATE: 83 BPM

## 2021-05-10 DIAGNOSIS — Z01.419 WELL WOMAN EXAM WITH ROUTINE GYNECOLOGICAL EXAM: Primary | ICD-10-CM

## 2021-05-10 PROCEDURE — 99396 PREV VISIT EST AGE 40-64: CPT | Performed by: OBSTETRICS & GYNECOLOGY

## 2021-05-10 RX ORDER — BUSPIRONE HYDROCHLORIDE 5 MG/1
5 TABLET ORAL ONCE AS NEEDED
COMMUNITY
Start: 2020-12-31 | End: 2022-06-27

## 2021-05-10 RX ORDER — NORETHINDRONE ACETATE AND ETHINYL ESTRADIOL, ETHINYL ESTRADIOL AND FERROUS FUMARATE 1MG-10(24)
1 KIT ORAL DAILY
Qty: 84 TABLET | Refills: 4 | Status: SHIPPED | OUTPATIENT
Start: 2021-05-10 | End: 2021-08-16

## 2021-05-10 RX ORDER — TRETINOIN 0.5 MG/G
CREAM TOPICAL
COMMUNITY
Start: 2021-02-15

## 2021-05-10 NOTE — PROGRESS NOTES
Chief Complaint   Patient presents with   • Annual Exam     pap: 2020 NIL (no hpv test), mammo:2021        Marah Britt is a 45 y.o.  who presents for an annual examination.     Pap history:  Last pap: 2020 NIL  Prior abnormal paps: yes, had biopsy in 2018  Colonoscopy:  Yes, treated for UC, follows with GI  Mammogram: 2021, due in 2021  STDs  Sexually active: yes, has negative testing previously  History of STDs: no  Periods:   On LoLoestrin (reports that it has improved her mental health)   Denies h/o VTE in legs or lungs; father had PE after surgery; denies family h/o coagulopathy    Is patient's family or personal history significant for any risks for BRCA? no    Past Medical History:   Diagnosis Date   • Abnormal Pap smear of cervix 2019   • Acne    • ADHD    • Ulcerative colitis (CMS/HCC)      Past Surgical History:   Procedure Laterality Date   • AUGMENTATION MAMMAPLASTY     • BREAST AUGMENTATION     •  SECTION     • COLONOSCOPY  2015    left sided ulcerative colitis   • COLONOSCOPY  2011    Probable ulcerative proctitis? with some inflammation around the appendiceal orifice   • COLONOSCOPY N/A 10/22/2018    Normal   • COLONOSCOPY N/A 3/15/2021    Procedure: COLONOSCOPY TO CECUM WITH BIOPSIES;  Surgeon: David Capellan MD;  Location: [facility] ENDOSCOPY;  Service: Gastroenterology;  Laterality: N/A;  PRE- HISTORY ULCERATIVE COLITIS  POST- NORMAL   • UPPER GASTROINTESTINAL ENDOSCOPY  2015    gastritis   • WISDOM TOOTH EXTRACTION       OB History    Para Term  AB Living   1 1 1     1   SAB TAB Ectopic Molar Multiple Live Births             1      # Outcome Date GA Lbr Kvng/2nd Weight Sex Delivery Anes PTL Lv   1 Term 2018     CS-Unspec   RACHID     Social History     Tobacco Use   • Smoking status: Former Smoker     Types: Cigarettes     Quit date:      Years since quitting: 15.3   • Smokeless tobacco: Never Used   • Tobacco comment:  smoked some in twenties.   Vaping Use   • Vaping Use: Never used   Substance Use Topics   • Alcohol use: Yes     Comment: rare/socail    • Drug use: No     Family History   Problem Relation Age of Onset   • Pancreatic cancer Maternal Grandmother    • Melanoma Sister    • Prostate cancer Maternal Uncle    • Melanoma Maternal Grandfather    • Pulmonary embolism Father    • Breast cancer Neg Hx    • Ovarian cancer Neg Hx    • Uterine cancer Neg Hx    • Colon cancer Neg Hx    • Deep vein thrombosis Neg Hx      Current Outpatient Medications on File Prior to Visit   Medication Sig Dispense Refill   • amphetamine-dextroamphetamine XR (ADDERALL XR) 10 MG 24 hr capsule      • BIOTIN PO Take  by mouth.     • busPIRone (BUSPAR) 5 MG tablet Take 5 mg by mouth 1 (One) Time As Needed.     • Lo Loestrin Fe 1 MG-10 MCG / 10 MCG tablet TAKE 1 TABLET BY MOUTH DAILY 84 tablet 0   • mesalamine (Lialda) 1.2 g EC tablet TAKE 4 TABLETS BY MOUTH DAILY 120 tablet 1   • mesalamine (Lialda) 1.2 g EC tablet Take 4 tablets by mouth daily 120 tablet 3   • montelukast (SINGULAIR) 10 MG tablet TAKE 1 TABLET BY MOUTH EVERY NIGHT     • spironolactone (ALDACTONE) 25 MG tablet TK 1 TO 2 TS PO D  0   • tretinoin (Retin-A) 0.05 % cream      • VITAMIN D PO Take  by mouth Daily.     • [DISCONTINUED] fluconazole (Diflucan) 150 MG tablet Take 1 tablet by mouth Daily. 1 tablet 2   • [DISCONTINUED] ST JOHNS WORT PO Take 1 tablet by mouth Daily.       No current facility-administered medications on file prior to visit.     No Known Allergies     Review of Systems   Constitutional: Negative.    HENT: Negative.    Respiratory: Negative.    Cardiovascular: Negative.    Gastrointestinal: Negative.    Endocrine: Negative.    Genitourinary: Negative.    Musculoskeletal: Negative.    Skin: Negative.    Neurological: Negative.    Psychiatric/Behavioral: Negative.        OBJECTIVE:   Vitals:    05/10/21 1141   BP: 117/86   Pulse: 83   Weight: 61.2 kg (135 lb)  "  Height: 152.4 cm (60\")      Physical Exam  Exam conducted with a chaperone present.   Constitutional:       General: She is not in acute distress.     Appearance: She is well-developed. She is not diaphoretic.   HENT:      Head: Normocephalic and atraumatic.   Neck:      Thyroid: No thyromegaly.      Trachea: No tracheal deviation.   Cardiovascular:      Rate and Rhythm: Normal rate.      Heart sounds: Normal heart sounds. No murmur heard.   No friction rub. No gallop.    Pulmonary:      Effort: Pulmonary effort is normal. No respiratory distress.      Breath sounds: Normal breath sounds.   Chest:      Chest wall: No tenderness.      Breasts:         Right: No inverted nipple, mass, nipple discharge, skin change or tenderness.         Left: No inverted nipple, mass, nipple discharge, skin change or tenderness.   Abdominal:      General: There is no distension.      Palpations: Abdomen is soft. There is no mass.      Tenderness: There is no abdominal tenderness.   Genitourinary:     General: Normal vulva.      Labia:         Right: No rash, lesion or injury.         Left: No rash, lesion or injury.       Vagina: No vaginal discharge, tenderness or bleeding.      Cervix: No cervical motion tenderness, discharge or friability.      Uterus: Not deviated, not enlarged, not fixed and not tender.       Adnexa:         Right: No mass, tenderness or fullness.          Left: No mass, tenderness or fullness.     Musculoskeletal:         General: No deformity. Normal range of motion.   Lymphadenopathy:      Cervical: No cervical adenopathy.   Skin:     General: Skin is warm and dry.      Findings: No rash.   Neurological:      Mental Status: She is alert and oriented to person, place, and time.   Psychiatric:         Behavior: Behavior normal.         Thought Content: Thought content normal.         Judgment: Judgment normal.         ASSESSMENT/PLAN:  Annual well woman visit:  Cervical cancer screening:    Reports  h/o low " grade cervical dysplasia   The patient is due for a pap today.    Screening guidelines discussed with patient  Breast cancer screening:    Mammogram due in June 2021   Breast self awareness encouraged  Colonscopy:   Follows with GI due to h/o UC  Family history    does not demonstrate need for genetics referral   Healthy lifestyle counseling:   return for routine annual checkups   Reviewed limitations of HSV screening   She is considering HPV vaccination.      BMI Counseling  Body mass index is 26.37 kg/m².

## 2021-05-13 LAB
CYTOLOGIST CVX/VAG CYTO: NORMAL
CYTOLOGY CVX/VAG DOC CYTO: NORMAL
CYTOLOGY CVX/VAG DOC THIN PREP: NORMAL
DX ICD CODE: NORMAL
HIV 1 & 2 AB SER-IMP: NORMAL
HPV I/H RISK 4 DNA CVX QL PROBE+SIG AMP: NEGATIVE
OTHER STN SPEC: NORMAL
STAT OF ADQ CVX/VAG CYTO-IMP: NORMAL

## 2021-05-15 ENCOUNTER — IMMUNIZATION (OUTPATIENT)
Dept: VACCINE CLINIC | Facility: HOSPITAL | Age: 46
End: 2021-05-15

## 2021-05-15 ENCOUNTER — APPOINTMENT (OUTPATIENT)
Dept: VACCINE CLINIC | Facility: HOSPITAL | Age: 46
End: 2021-05-15

## 2021-05-15 PROCEDURE — 91300 HC SARSCOV02 VAC 30MCG/0.3ML IM: CPT | Performed by: INTERNAL MEDICINE

## 2021-05-15 PROCEDURE — 0001A: CPT | Performed by: INTERNAL MEDICINE

## 2021-05-20 DIAGNOSIS — N63.20 BREAST MASS, LEFT: ICD-10-CM

## 2021-05-20 DIAGNOSIS — Z09 FOLLOW-UP EXAM, 3-6 MONTHS SINCE PREVIOUS EXAM: Primary | ICD-10-CM

## 2021-06-03 ENCOUNTER — APPOINTMENT (OUTPATIENT)
Dept: VACCINE CLINIC | Facility: HOSPITAL | Age: 46
End: 2021-06-03

## 2021-06-04 ENCOUNTER — IMMUNIZATION (OUTPATIENT)
Dept: VACCINE CLINIC | Facility: HOSPITAL | Age: 46
End: 2021-06-04

## 2021-06-04 PROCEDURE — 0002A: CPT | Performed by: INTERNAL MEDICINE

## 2021-06-04 PROCEDURE — 91300 HC SARSCOV02 VAC 30MCG/0.3ML IM: CPT | Performed by: INTERNAL MEDICINE

## 2021-06-05 ENCOUNTER — APPOINTMENT (OUTPATIENT)
Dept: VACCINE CLINIC | Facility: HOSPITAL | Age: 46
End: 2021-06-05

## 2021-06-14 ENCOUNTER — APPOINTMENT (OUTPATIENT)
Dept: WOMENS IMAGING | Facility: HOSPITAL | Age: 46
End: 2021-06-14

## 2021-06-14 PROCEDURE — 76641 ULTRASOUND BREAST COMPLETE: CPT | Performed by: RADIOLOGY

## 2021-06-14 PROCEDURE — 77066 DX MAMMO INCL CAD BI: CPT | Performed by: RADIOLOGY

## 2021-06-14 PROCEDURE — 77062 BREAST TOMOSYNTHESIS BI: CPT | Performed by: RADIOLOGY

## 2021-06-14 PROCEDURE — G0279 TOMOSYNTHESIS, MAMMO: HCPCS | Performed by: RADIOLOGY

## 2021-06-21 DIAGNOSIS — Z09 FOLLOW-UP EXAM, 3-6 MONTHS SINCE PREVIOUS EXAM: ICD-10-CM

## 2021-06-21 DIAGNOSIS — N63.20 BREAST MASS, LEFT: ICD-10-CM

## 2021-07-08 ENCOUNTER — TELEPHONE (OUTPATIENT)
Dept: OBSTETRICS AND GYNECOLOGY | Facility: CLINIC | Age: 46
End: 2021-07-08

## 2021-07-08 NOTE — TELEPHONE ENCOUNTER
----- Message from Pamela Belcher MD sent at 5/12/2021  9:26 PM EDT -----  Regarding: Hpv vaccine  Please call patient and let her know that she can start the HPV vaccine and complete it as long as she starts while she is 45. Her insurance coverage is not guaranteed but I think the  has the potential out of pocket cost. Thanks!    07/08/21  Pt stated she will call insurance to see if HPV vaccine is covered and to see find out how much is out of pocket. She stated she will call to scheduled an injection appt after finding out the information.

## 2021-08-16 RX ORDER — NORETHINDRONE ACETATE AND ETHINYL ESTRADIOL, ETHINYL ESTRADIOL AND FERROUS FUMARATE 1MG-10(24)
1 KIT ORAL DAILY
Qty: 84 TABLET | Refills: 3 | Status: SHIPPED | OUTPATIENT
Start: 2021-08-16 | End: 2022-04-07 | Stop reason: SDUPTHER

## 2021-08-19 RX ORDER — MESALAMINE 1.2 G/1
TABLET, DELAYED RELEASE ORAL
Qty: 120 TABLET | Refills: 3 | Status: SHIPPED | OUTPATIENT
Start: 2021-08-19 | End: 2022-03-17 | Stop reason: SDUPTHER

## 2021-08-24 ENCOUNTER — TELEPHONE (OUTPATIENT)
Dept: GASTROENTEROLOGY | Facility: CLINIC | Age: 46
End: 2021-08-24

## 2021-08-24 NOTE — TELEPHONE ENCOUNTER
See lialda refill of 8/19/21 for lialda 4 tabs by mouth daily, #120, R3 with receipt confirmed to [facility].     Call to pt.   has received notification from pharmacy that rx ready for .

## 2021-08-24 NOTE — TELEPHONE ENCOUNTER
----- Message from Chetan Sahu sent at 8/24/2021  8:19 AM EDT -----  Regarding: refill  Contact: [phone]  Pt stated that she is out of [facility] and needs a refill pt stated that she is going out of town today and would like to have it before she leaves. Pt also stated that she would like for someone to give her a call if possible?

## 2021-09-13 ENCOUNTER — OFFICE VISIT (OUTPATIENT)
Dept: GASTROENTEROLOGY | Facility: CLINIC | Age: 46
End: 2021-09-13

## 2021-09-13 VITALS
BODY MASS INDEX: 28.35 KG/M2 | HEIGHT: 60 IN | TEMPERATURE: 97.7 F | SYSTOLIC BLOOD PRESSURE: 124 MMHG | DIASTOLIC BLOOD PRESSURE: 80 MMHG | WEIGHT: 144.4 LBS

## 2021-09-13 DIAGNOSIS — K29.70 HELICOBACTER PYLORI GASTRITIS: ICD-10-CM

## 2021-09-13 DIAGNOSIS — K51.00 ULCERATIVE PANCOLITIS WITHOUT COMPLICATION (HCC): Primary | ICD-10-CM

## 2021-09-13 DIAGNOSIS — K63.5 POLYP OF COLON, UNSPECIFIED PART OF COLON, UNSPECIFIED TYPE: ICD-10-CM

## 2021-09-13 DIAGNOSIS — B96.81 HELICOBACTER PYLORI GASTRITIS: ICD-10-CM

## 2021-09-13 PROCEDURE — 99214 OFFICE O/P EST MOD 30 MIN: CPT | Performed by: INTERNAL MEDICINE

## 2021-09-13 NOTE — PROGRESS NOTES
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 She is on mesalamine 4 pills/day. She last had a colonoscopy on 3/15/21 and it showed that her colitis was fairly quiet. She feels good. No diarrhea or rectal bleeding. No abdominal or chest pain. She averages 2 BM/day. No nausea or vovmiting. No fevers, chills. LMP - on BCP, would have been 1.5 weeks ago.  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  Weight stable.     Past Medical History:   Diagnosis Date   • Abnormal Pap smear of cervix ,    • Acne    • ADHD    • Ulcerative colitis (CMS/HCC)        Past Surgical History:   Procedure Laterality Date   • AUGMENTATION MAMMAPLASTY     • BREAST AUGMENTATION     •  SECTION     • COLONOSCOPY  2015    left sided ulcerative colitis   • COLONOSCOPY  2011    Probable ulcerative proctitis? with some inflammation around the appendiceal orifice   • COLONOSCOPY N/A 10/22/2018    Normal   • COLONOSCOPY N/A 3/15/2021    Procedure: COLONOSCOPY TO CECUM WITH BIOPSIES;  Surgeon: David Capellan MD;  Location: [facility] ENDOSCOPY;  Service: Gastroenterology;  Laterality: N/A;  PRE- HISTORY ULCERATIVE COLITIS  POST- NORMAL   • UPPER GASTROINTESTINAL ENDOSCOPY  2015    gastritis   • WISDOM TOOTH EXTRACTION           Current Outpatient Medications:   •  amphetamine-dextroamphetamine XR (ADDERALL XR) 10 MG 24 hr capsule, , Disp: , Rfl:   •  BIOTIN PO, Take  by mouth., Disp: , Rfl:   •  Lo Loestrin Fe 1 MG-10 MCG / 10 MCG tablet, TAKE 1 TABLET BY MOUTH DAILY, Disp: 84 tablet, Rfl: 3  •  mesalamine (Lialda) 1.2 g EC tablet, TAKE 4 TABLETS BY MOUTH DAILY, Disp: 120 tablet, Rfl: 1  •  montelukast (SINGULAIR) 10 MG tablet, TAKE 1 TABLET BY MOUTH EVERY NIGHT, Disp: , Rfl:   •  spironolactone (ALDACTONE) 25 MG tablet, TK 1 TO 2 TS PO D, Disp: , Rfl: 0  •   tretinoin (Retin-A) 0.05 % cream, , Disp: , Rfl:   •  VITAMIN D PO, Take  by mouth Daily., Disp: , Rfl:   •  busPIRone (BUSPAR) 5 MG tablet, Take 5 mg by mouth 1 (One) Time As Needed., Disp: , Rfl:   •  mesalamine (Lialda) 1.2 g EC tablet, TAKE 4 TABLETS BY MOUTH DAILY, Disp: 120 tablet, Rfl: 3  •  mesalamine (Lialda) 1.2 g EC tablet, TAKE 4 TABLETS BY MOUTH DAILY, Disp: 120 tablet, Rfl: 3    No Known Allergies    Family History   Problem Relation Age of Onset   • Pancreatic cancer Maternal Grandmother    • Melanoma Sister    • Prostate cancer Maternal Uncle    • Melanoma Maternal Grandfather    • Pulmonary embolism Father    • Breast cancer Neg Hx    • Ovarian cancer Neg Hx    • Uterine cancer Neg Hx    • Colon cancer Neg Hx    • Deep vein thrombosis Neg Hx        Social History     Socioeconomic History   • Marital status:      Spouse name: Not on file   • Number of children: Not on file   • Years of education: Not on file   • Highest education level: Not on file   Tobacco Use   • Smoking status: Former Smoker     Types: Cigarettes     Quit date: 2006     Years since quitting: 15.7   • Smokeless tobacco: Never Used   • Tobacco comment: smoked some in twenties.   Vaping Use   • Vaping Use: Never used   Substance and Sexual Activity   • Alcohol use: Yes     Comment: rare/socail    • Drug use: No   • Sexual activity: Not Currently     Birth control/protection: OCP       Review of Systems   Gastrointestinal: Negative for abdominal pain, anal bleeding and diarrhea.   All other systems reviewed and are negative.    Pertinent positives and negatives documented in the HPI and all other systems reviewed and were found to be negative.  Vitals:    09/13/21 0829   BP: 124/80   Temp: 97.7 °F (36.5 °C)       Physical Exam  Vitals reviewed.   Constitutional:       General: She is not in acute distress.     Appearance: Normal appearance. She is well-developed. She is not diaphoretic.   HENT:      Head: Normocephalic and  atraumatic. Hair is normal.      Right Ear: Hearing, tympanic membrane, ear canal and external ear normal. No decreased hearing noted. No drainage.      Left Ear: Hearing, tympanic membrane, ear canal and external ear normal. No decreased hearing noted.      Nose: Nose normal. No nasal deformity.      Mouth/Throat:      Mouth: Mucous membranes are moist.   Eyes:      General: Lids are normal.         Right eye: No discharge.         Left eye: No discharge.      Extraocular Movements: Extraocular movements intact.      Conjunctiva/sclera: Conjunctivae normal.      Pupils: Pupils are equal, round, and reactive to light.   Neck:      Thyroid: No thyromegaly.      Vascular: No JVD.      Trachea: No tracheal deviation.   Cardiovascular:      Rate and Rhythm: Normal rate and regular rhythm.      Pulses: Normal pulses.      Heart sounds: Normal heart sounds. No murmur heard.   No friction rub. No gallop.    Pulmonary:      Effort: Pulmonary effort is normal. No respiratory distress.      Breath sounds: Normal breath sounds. No wheezing or rales.   Chest:      Chest wall: No tenderness.   Abdominal:      General: Bowel sounds are normal. There is no distension.      Palpations: Abdomen is soft. There is no mass.      Tenderness: There is no abdominal tenderness. There is no guarding or rebound.      Hernia: No hernia is present.   Musculoskeletal:         General: No tenderness or deformity. Normal range of motion.      Cervical back: Normal range of motion and neck supple.   Lymphadenopathy:      Cervical: No cervical adenopathy.   Skin:     General: Skin is warm and dry.      Findings: No erythema or rash.   Neurological:      Mental Status: She is alert and oriented to person, place, and time.      Cranial Nerves: No cranial nerve deficit.      Motor: No abnormal muscle tone.      Coordination: Coordination normal.      Deep Tendon Reflexes: Reflexes are normal and symmetric. Reflexes normal.   Psychiatric:         Mood  and Affect: Mood normal.         Behavior: Behavior normal.         Thought Content: Thought content normal.         Judgment: Judgment normal.         Diagnoses and all orders for this visit:    1. Ulcerative pancolitis without complication (CMS/HCC) (Primary)    2. Polyp of colon, unspecified part of colon, unspecified type    3. Helicobacter pylori gastritis  -     H. Pylori Breath Test - Breath, Lung; Future      Assessment:  1. h/o ulcerative colitis diagnosed in 2009. She is on mesalamine 4 pills/day. She last had a colonoscopy in 3/21. She is doing well.   2. H/o h. Pylori gastritis - treated in 2015.    Recommendations:  1. Continue Mesalamine 4/day.   2. F/u 6 mos.   3. H. Pylori breath test to be done in 4 weeks.     Return in about 6 months (around 3/13/2022), or Schedule her for a helicobacter pylori breath test in 4 weeks..    David Capellan MD  9/13/2021

## 2021-09-18 ENCOUNTER — APPOINTMENT (OUTPATIENT)
Dept: VACCINE CLINIC | Facility: HOSPITAL | Age: 46
End: 2021-09-18

## 2021-11-01 ENCOUNTER — LAB (OUTPATIENT)
Dept: GASTROENTEROLOGY | Facility: CLINIC | Age: 46
End: 2021-11-01

## 2021-11-03 ENCOUNTER — TELEPHONE (OUTPATIENT)
Dept: GASTROENTEROLOGY | Facility: CLINIC | Age: 46
End: 2021-11-03

## 2021-11-03 NOTE — TELEPHONE ENCOUNTER
----- Message from David Capellan MD sent at 11/2/2021  3:25 PM EDT -----  11/02/21       Tell her that her Helicobacter pylori breath test came back negative which means that she does not now have Helicobacter pylori lining her stomach, which is good.  Please send a copy of this report to her PCP.  Obduliox. kjh

## 2021-11-03 NOTE — TELEPHONE ENCOUNTER
Call to pt.  Advise per Dr Capellan note.  Verb understanding.     Lab faxed via epic to Dr Elise Penn.

## 2021-12-02 ENCOUNTER — TELEPHONE (OUTPATIENT)
Dept: GASTROENTEROLOGY | Facility: CLINIC | Age: 46
End: 2021-12-02

## 2021-12-02 NOTE — TELEPHONE ENCOUNTER
Called pt and pt reports that she is having a hard time getting her mesalamine from [facility]. She states that they have it ordered but have not gotten it.   Pt is asking if we can send in a 90 day supply to [facility].      Attempted to [facility] to [facility] but med will need pa .  Advised pt of this.  Verb understanding.  Advised pt she check with other [facility] and see if they have her mesalamine and get it from them .  Pt verb understanding and is going to do this and will call us back if she has any problems.

## 2021-12-02 NOTE — TELEPHONE ENCOUNTER
----- Message from Adam Prince RuizMatt Rep sent at 12/2/2021 11:49 AM EST -----  Regarding: mesalamine (Lialda) 1.2 g EC tablet  Contact: [phone]  Pt is wanting refill sent to pharmacy below. She states they have 3 bottles of Mesalamine and she wants to know if all 3 bottles can be prescribed to her.      [facility]/pharm[facility] #[id number] - [place], [place] - [address] - [phone]  - [phone] FX

## 2021-12-02 NOTE — TELEPHONE ENCOUNTER
----- Message from Chetan Max Rep sent at 12/2/2021 11:35 AM EST -----  Regarding: pharmacy change  Contact: [phone]  Pt is having an issue getting med thru [facility], once to change to new pharmacy, please send new script    mesalamine (Lialda) 1.2 g EC tablet           [facility]  [phone]

## 2021-12-02 NOTE — TELEPHONE ENCOUNTER
----- Message from Chetan Moraes sent at 12/2/2021 11:58 AM EST -----  Regarding: mesalamine (Lialda) 1.2 g EC tablet  Contact: [phone]  Pt wants to know if she can get the generic brand, because the [facility] [address] has the generic brand but it would need to be called in to at [phone]. She said it also needs a p/a.

## 2021-12-10 ENCOUNTER — TELEPHONE (OUTPATIENT)
Dept: OBSTETRICS AND GYNECOLOGY | Facility: CLINIC | Age: 46
End: 2021-12-10

## 2021-12-10 DIAGNOSIS — N60.02 CYST OF LEFT BREAST: ICD-10-CM

## 2021-12-10 DIAGNOSIS — Z09 FOLLOW-UP EXAM, 3-6 MONTHS SINCE PREVIOUS EXAM: Primary | ICD-10-CM

## 2021-12-10 NOTE — TELEPHONE ENCOUNTER
Pt scheduled her 6 mth follow up at [facility] 12/16/21 @ 845am for DX Left Mammo & Left US.  SR

## 2021-12-16 ENCOUNTER — APPOINTMENT (OUTPATIENT)
Dept: WOMENS IMAGING | Facility: HOSPITAL | Age: 46
End: 2021-12-16

## 2021-12-16 PROCEDURE — 76641 ULTRASOUND BREAST COMPLETE: CPT | Performed by: RADIOLOGY

## 2021-12-16 PROCEDURE — 77061 BREAST TOMOSYNTHESIS UNI: CPT | Performed by: RADIOLOGY

## 2021-12-16 PROCEDURE — G0279 TOMOSYNTHESIS, MAMMO: HCPCS | Performed by: RADIOLOGY

## 2021-12-16 PROCEDURE — 77065 DX MAMMO INCL CAD UNI: CPT | Performed by: RADIOLOGY

## 2021-12-27 DIAGNOSIS — N60.02 CYST OF LEFT BREAST: ICD-10-CM

## 2021-12-27 DIAGNOSIS — Z09 FOLLOW-UP EXAM, 3-6 MONTHS SINCE PREVIOUS EXAM: ICD-10-CM

## 2021-12-27 NOTE — TELEPHONE ENCOUNTER
Spoke with patient and she is aware of stable findings from recent DX Mammo & US.  She will contact [facility] to schedule her 6 mth follow up for DX Bilat Mammo & Left BR US in June 2022.  I also have her on file to follow.  SR

## 2022-01-04 ENCOUNTER — APPOINTMENT (OUTPATIENT)
Dept: GENERAL RADIOLOGY | Facility: HOSPITAL | Age: 47
End: 2022-01-04

## 2022-01-04 PROCEDURE — 36415 COLL VENOUS BLD VENIPUNCTURE: CPT

## 2022-01-04 PROCEDURE — 93010 ELECTROCARDIOGRAM REPORT: CPT | Performed by: INTERNAL MEDICINE

## 2022-01-04 PROCEDURE — 99283 EMERGENCY DEPT VISIT LOW MDM: CPT

## 2022-01-04 PROCEDURE — 93005 ELECTROCARDIOGRAM TRACING: CPT

## 2022-01-04 PROCEDURE — 93005 ELECTROCARDIOGRAM TRACING: CPT | Performed by: EMERGENCY MEDICINE

## 2022-01-04 RX ORDER — SODIUM CHLORIDE 0.9 % (FLUSH) 0.9 %
10 SYRINGE (ML) INJECTION AS NEEDED
Status: DISCONTINUED | OUTPATIENT
Start: 2022-01-04 | End: 2022-01-05 | Stop reason: HOSPADM

## 2022-01-05 ENCOUNTER — APPOINTMENT (OUTPATIENT)
Dept: GENERAL RADIOLOGY | Facility: HOSPITAL | Age: 47
End: 2022-01-05

## 2022-01-05 ENCOUNTER — HOSPITAL ENCOUNTER (EMERGENCY)
Facility: HOSPITAL | Age: 47
Discharge: HOME | End: 2022-01-05
Attending: EMERGENCY MEDICINE

## 2022-01-05 VITALS
RESPIRATION RATE: 16 BRPM | SYSTOLIC BLOOD PRESSURE: 105 MMHG | HEIGHT: 60 IN | BODY MASS INDEX: 29.45 KG/M2 | TEMPERATURE: 98.7 F | HEART RATE: 83 BPM | WEIGHT: 150 LBS | DIASTOLIC BLOOD PRESSURE: 95 MMHG | OXYGEN SATURATION: 98 %

## 2022-01-05 DIAGNOSIS — Z87.19 HISTORY OF ULCERATIVE COLITIS: ICD-10-CM

## 2022-01-05 DIAGNOSIS — U07.1 COVID-19 VIRUS INFECTION: ICD-10-CM

## 2022-01-05 DIAGNOSIS — R00.2 PALPITATIONS: Primary | ICD-10-CM

## 2022-01-05 LAB
ALBUMIN SERPL-MCNC: 4.2 G/DL (ref 3.5–5.2)
ALBUMIN/GLOB SERPL: 1.4 G/DL
ALP SERPL-CCNC: 59 U/L (ref 39–117)
ALT SERPL W P-5'-P-CCNC: 18 U/L (ref 1–33)
ANION GAP SERPL CALCULATED.3IONS-SCNC: 11 MMOL/L (ref 5–15)
APTT PPP: 27.7 SECONDS (ref 22.7–35.4)
AST SERPL-CCNC: 16 U/L (ref 1–32)
BASOPHILS # BLD AUTO: 0.03 10*3/MM3 (ref 0–0.2)
BASOPHILS NFR BLD AUTO: 0.3 % (ref 0–1.5)
BILIRUB SERPL-MCNC: 0.5 MG/DL (ref 0–1.2)
BUN SERPL-MCNC: 10 MG/DL (ref 6–20)
BUN/CREAT SERPL: 14.7 (ref 7–25)
CALCIUM SPEC-SCNC: 9.1 MG/DL (ref 8.6–10.5)
CHLORIDE SERPL-SCNC: 107 MMOL/L (ref 98–107)
CO2 SERPL-SCNC: 22 MMOL/L (ref 22–29)
CREAT SERPL-MCNC: 0.68 MG/DL (ref 0.57–1)
DEPRECATED RDW RBC AUTO: 39 FL (ref 37–54)
EOSINOPHIL # BLD AUTO: 0.04 10*3/MM3 (ref 0–0.4)
EOSINOPHIL NFR BLD AUTO: 0.5 % (ref 0.3–6.2)
ERYTHROCYTE [DISTWIDTH] IN BLOOD BY AUTOMATED COUNT: 11.6 % (ref 12.3–15.4)
GFR SERPL CREATININE-BSD FRML MDRD: 93 ML/MIN/1.73
GLOBULIN UR ELPH-MCNC: 3.1 GM/DL
GLUCOSE SERPL-MCNC: 104 MG/DL (ref 65–99)
HCG SERPL QL: NEGATIVE
HCT VFR BLD AUTO: 41.1 % (ref 34–46.6)
HGB BLD-MCNC: 13.5 G/DL (ref 12–15.9)
IMM GRANULOCYTES # BLD AUTO: 0.03 10*3/MM3 (ref 0–0.05)
IMM GRANULOCYTES NFR BLD AUTO: 0.3 % (ref 0–0.5)
INR PPP: 1.02 (ref 0.9–1.1)
LYMPHOCYTES # BLD AUTO: 3.86 10*3/MM3 (ref 0.7–3.1)
LYMPHOCYTES NFR BLD AUTO: 44.5 % (ref 19.6–45.3)
MCH RBC QN AUTO: 30.3 PG (ref 26.6–33)
MCHC RBC AUTO-ENTMCNC: 32.8 G/DL (ref 31.5–35.7)
MCV RBC AUTO: 92.2 FL (ref 79–97)
MONOCYTES # BLD AUTO: 0.66 10*3/MM3 (ref 0.1–0.9)
MONOCYTES NFR BLD AUTO: 7.6 % (ref 5–12)
NEUTROPHILS NFR BLD AUTO: 4.05 10*3/MM3 (ref 1.7–7)
NEUTROPHILS NFR BLD AUTO: 46.8 % (ref 42.7–76)
NRBC BLD AUTO-RTO: 0 /100 WBC (ref 0–0.2)
NT-PROBNP SERPL-MCNC: 36.8 PG/ML (ref 0–450)
PLATELET # BLD AUTO: 324 10*3/MM3 (ref 140–450)
PMV BLD AUTO: 9.2 FL (ref 6–12)
POTASSIUM SERPL-SCNC: 3.7 MMOL/L (ref 3.5–5.2)
PROT SERPL-MCNC: 7.3 G/DL (ref 6–8.5)
PROTHROMBIN TIME: 13.2 SECONDS (ref 11.7–14.2)
QT INTERVAL: 353 MS
RBC # BLD AUTO: 4.46 10*6/MM3 (ref 3.77–5.28)
SODIUM SERPL-SCNC: 140 MMOL/L (ref 136–145)
TROPONIN T SERPL-MCNC: <0.01 NG/ML (ref 0–0.03)
WBC NRBC COR # BLD: 8.67 10*3/MM3 (ref 3.4–10.8)

## 2022-01-05 PROCEDURE — 85610 PROTHROMBIN TIME: CPT | Performed by: EMERGENCY MEDICINE

## 2022-01-05 PROCEDURE — 71045 X-RAY EXAM CHEST 1 VIEW: CPT

## 2022-01-05 PROCEDURE — 80053 COMPREHEN METABOLIC PANEL: CPT | Performed by: EMERGENCY MEDICINE

## 2022-01-05 PROCEDURE — 85025 COMPLETE CBC W/AUTO DIFF WBC: CPT | Performed by: EMERGENCY MEDICINE

## 2022-01-05 PROCEDURE — 84484 ASSAY OF TROPONIN QUANT: CPT | Performed by: EMERGENCY MEDICINE

## 2022-01-05 PROCEDURE — 36415 COLL VENOUS BLD VENIPUNCTURE: CPT

## 2022-01-05 PROCEDURE — 83880 ASSAY OF NATRIURETIC PEPTIDE: CPT | Performed by: EMERGENCY MEDICINE

## 2022-01-05 PROCEDURE — 84703 CHORIONIC GONADOTROPIN ASSAY: CPT | Performed by: EMERGENCY MEDICINE

## 2022-01-05 PROCEDURE — 85730 THROMBOPLASTIN TIME PARTIAL: CPT | Performed by: EMERGENCY MEDICINE

## 2022-01-05 NOTE — ED NOTES
.RN wearing all appropriate PPE during entire encounter with patient.        Behringer, Catherine, RN  01/05/22 8716

## 2022-01-05 NOTE — DISCHARGE INSTRUCTIONS
Home, rest, follow-up with the outpatient infusion center for your antibiotic therapy.  Tylenol as needed for fever, keep well-hydrated, over-the-counter cough medicine as needed.  Home medicine as prescribed, follow up with PCP for recheck. Return to care if symptoms worsen or with further concerns.

## 2022-01-05 NOTE — ED NOTES
Pt states she got her COVID booster on the 28th and tested positive for COVID on 1/1/22. Pt states she has felt her heart racing for approx 3days    Pt wearing mask, triage personnel wearing appropriate PPE.      Arpit King, RN  01/04/22 2054

## 2022-01-05 NOTE — ED PROVIDER NOTES
EMERGENCY DEPARTMENT ENCOUNTER    Room Number:    Date of encounter:  2022  PCP: Elise Valero MD  Historian: Patient      HPI:  Chief Complaint: Palpitations      Context: Marah Britt is a 46 y.o. female with past medical history of ADHD and ulcerative colitis who presents to the ED c/o palpitations.  Patient states that she received her Covid vaccine booster on 2021, but prior to that has started to feel unwell.  Patient has had headaches, body aches, cough, fatigue, and a general unwell feeling.  Patient states that on 2022 she tested positive for Covid, and since that time she has had sensation of heart pounding, bad dreams, and feeling worse.  Patient is very anxious and concerned about her health because she is a  and has a 13-year-old son for which she is the sole provider and her best friend  from Covid.      PAST MEDICAL HISTORY  Active Ambulatory Problems     Diagnosis Date Noted   • Ulcerative pancolitis without complication ([facility]) 09/10/2018   • UC (ulcerative colitis) ([facility]) 2021   • Colon polyp 2021   • FH: colon polyps 2021     Resolved Ambulatory Problems     Diagnosis Date Noted   • No Resolved Ambulatory Problems     Past Medical History:   Diagnosis Date   • Abnormal Pap smear of cervix ,    • Acne    • ADHD    • Ulcerative colitis (CMS/[facility])          PAST SURGICAL HISTORY  Past Surgical History:   Procedure Laterality Date   • AUGMENTATION MAMMAPLASTY     • BREAST AUGMENTATION     •  SECTION     • COLONOSCOPY  2015    left sided ulcerative colitis   • COLONOSCOPY  2011    Probable ulcerative proctitis? with some inflammation around the appendiceal orifice   • COLONOSCOPY N/A 10/22/2018    Normal   • COLONOSCOPY N/A 3/15/2021    Procedure: COLONOSCOPY TO CECUM WITH BIOPSIES;  Surgeon: David Capellan MD;  Location: [facility] ENDOSCOPY;  Service: Gastroenterology;  Laterality: N/A;  PRE- HISTORY ULCERATIVE COLITIS  POST- NORMAL    • UPPER GASTROINTESTINAL ENDOSCOPY  2015    gastritis   • WISDOM TOOTH EXTRACTION           FAMILY HISTORY  Family History   Problem Relation Age of Onset   • Pancreatic cancer Maternal Grandmother    • Melanoma Sister    • Prostate cancer Maternal Uncle    • Melanoma Maternal Grandfather    • Pulmonary embolism Father    • Breast cancer Neg Hx    • Ovarian cancer Neg Hx    • Uterine cancer Neg Hx    • Colon cancer Neg Hx    • Deep vein thrombosis Neg Hx          SOCIAL HISTORY  Social History     Socioeconomic History   • Marital status:    Tobacco Use   • Smoking status: Former Smoker     Types: Cigarettes     Quit date:      Years since quittin.0   • Smokeless tobacco: Never Used   • Tobacco comment: smoked some in twenties.   Vaping Use   • Vaping Use: Never used   Substance and Sexual Activity   • Alcohol use: Yes     Comment: rare/socail    • Drug use: No   • Sexual activity: Not Currently     Birth control/protection: OCP         ALLERGIES  Patient has no known allergies.        REVIEW OF SYSTEMS  Review of Systems     All systems reviewed and negative except for those discussed in HPI.       PHYSICAL EXAM    I have reviewed the triage vital signs and nursing notes.    ED Triage Vitals [22]   Temp Heart Rate Resp BP SpO2   98.7 °F (37.1 °C) 104 18 105/94 97 %      Temp src Heart Rate Source Patient Position BP Location FiO2 (%)   -- -- -- -- --       Physical Exam  GENERAL: Alert and orient x3, anxious, not distressed  HENT: nares patent  EYES: no scleral icterus  CV: regular rhythm, regular rate  RESPIRATORY: normal effort  ABDOMEN: soft  MUSCULOSKELETAL: no deformity  NEURO: alert, moves all extremities, follows commands  SKIN: warm, dry        LAB RESULTS  Recent Results (from the past 24 hour(s))   ECG 12 Lead    Collection Time: 22  9:01 PM   Result Value Ref Range    QT Interval 353 ms   Comprehensive Metabolic Panel    Collection Time: 22  3:54 AM     Specimen: Blood   Result Value Ref Range    Glucose 104 (H) 65 - 99 mg/dL    BUN 10 6 - 20 mg/dL    Creatinine 0.68 0.57 - 1.00 mg/dL    Sodium 140 136 - 145 mmol/L    Potassium 3.7 3.5 - 5.2 mmol/L    Chloride 107 98 - 107 mmol/L    CO2 22.0 22.0 - 29.0 mmol/L    Calcium 9.1 8.6 - 10.5 mg/dL    Total Protein 7.3 6.0 - 8.5 g/dL    Albumin 4.20 3.50 - 5.20 g/dL    ALT (SGPT) 18 1 - 33 U/L    AST (SGOT) 16 1 - 32 U/L    Alkaline Phosphatase 59 39 - 117 U/L    Total Bilirubin 0.5 0.0 - 1.2 mg/dL    eGFR Non African Amer 93 >60 mL/min/1.73    Globulin 3.1 gm/dL    A/G Ratio 1.4 g/dL    BUN/Creatinine Ratio 14.7 7.0 - 25.0    Anion Gap 11.0 5.0 - 15.0 mmol/L   hCG, Serum, Qualitative    Collection Time: 01/05/22  3:54 AM    Specimen: Blood   Result Value Ref Range    HCG Qualitative Negative Negative   BNP    Collection Time: 01/05/22  3:54 AM    Specimen: Blood   Result Value Ref Range    proBNP 36.8 0.0 - 450.0 pg/mL   Troponin    Collection Time: 01/05/22  3:54 AM    Specimen: Blood   Result Value Ref Range    Troponin T <0.010 0.000 - 0.030 ng/mL   Protime-INR    Collection Time: 01/05/22  3:54 AM    Specimen: Blood   Result Value Ref Range    Protime 13.2 11.7 - 14.2 Seconds    INR 1.02 0.90 - 1.10   aPTT    Collection Time: 01/05/22  3:54 AM    Specimen: Blood   Result Value Ref Range    PTT 27.7 22.7 - 35.4 seconds   CBC Auto Differential    Collection Time: 01/05/22  3:54 AM    Specimen: Blood   Result Value Ref Range    WBC 8.67 3.40 - 10.80 10*3/mm3    RBC 4.46 3.77 - 5.28 10*6/mm3    Hemoglobin 13.5 12.0 - 15.9 g/dL    Hematocrit 41.1 34.0 - 46.6 %    MCV 92.2 79.0 - 97.0 fL    MCH 30.3 26.6 - 33.0 pg    MCHC 32.8 31.5 - 35.7 g/dL    RDW 11.6 (L) 12.3 - 15.4 %    RDW-SD 39.0 37.0 - 54.0 fl    MPV 9.2 6.0 - 12.0 fL    Platelets 324 140 - 450 10*3/mm3    Neutrophil % 46.8 42.7 - 76.0 %    Lymphocyte % 44.5 19.6 - 45.3 %    Monocyte % 7.6 5.0 - 12.0 %    Eosinophil % 0.5 0.3 - 6.2 %    Basophil % 0.3 0.0 - 1.5 %     Immature Grans % 0.3 0.0 - 0.5 %    Neutrophils, Absolute 4.05 1.70 - 7.00 10*3/mm3    Lymphocytes, Absolute 3.86 (H) 0.70 - 3.10 10*3/mm3    Monocytes, Absolute 0.66 0.10 - 0.90 10*3/mm3    Eosinophils, Absolute 0.04 0.00 - 0.40 10*3/mm3    Basophils, Absolute 0.03 0.00 - 0.20 10*3/mm3    Immature Grans, Absolute 0.03 0.00 - 0.05 10*3/mm3    nRBC 0.0 0.0 - 0.2 /100 WBC       Ordered the above labs and independently reviewed the results.        RADIOLOGY  XR Chest 1 View    Result Date: 1/5/2022  Patient: TOO PHILLIPS  Time Out: 00:11 Exam(s): FILM CXR 1 VIEW EXAM:   XR Chest, 1 View CLINICAL HISTORY:    Reason for exam: soa. TECHNIQUE:   Frontal view of the chest. COMPARISON:   No relevant prior studies available. FINDINGS:   Lungs:  Unremarkable.   Pleural space:  Unremarkable.   Heart:  Unremarkable.   Mediastinum:  Unremarkable.   Bones joints:  Unremarkable. IMPRESSION:       Normal chest x-ray.     Electronically signed by Cecil Pro MD on 01-05-22 at 0011      I ordered the above noted radiological studies. Reviewed by me and discussed with radiologist.  See dictation for official radiology interpretation.      PROCEDURES    Procedures      MEDICATIONS GIVEN IN ER    Medications   sodium chloride 0.9 % flush 10 mL (has no administration in time range)         PROGRESS, DATA ANALYSIS, CONSULTS, AND MEDICAL DECISION MAKING    All labs have been independently reviewed by me.  All radiology studies have been reviewed by me and discussed with radiologist dictating the report.   EKG's independently viewed and interpreted by me.  Discussion below represents my analysis of pertinent findings related to patient's condition, differential diagnosis, treatment plan and final disposition.    DDx: Includes but not limited to palpitations, tachycardia, A. fib, anxiety, Covid    ED Course as of 01/05/22 0537   Wed Jan 05, 2022   0533 EKG          EKG time: 21:01  Rhythm/Rate: Sinus rhythm at 87 bpm  P waves and  MO: Probable left atrial enlargement  QRS, axis: Normal  ST and T waves: No acute ST/T wave changes    Interpreted Contemporaneously by me, independently viewed  No prior EKG for comparison.   [KRYSTA]   0425 WBC: 8.67 [KRYSTA]   0500 Patient requesting antibiotic infusion therapy for her Covid infection, will order this. [KRYSTA]   0513 Hemoglobin: 13.5 [KRYSTA]   0513 Hematocrit: 41.1 [KRYSTA]   0513 Platelets: 324 [KRYSTA]   0513 Glucose(!): 104 [KRYSTA]   0513 BUN: 10 [KRYSTA]   0513 Creatinine: 0.68 [KRYSTA]   0513 Sodium: 140 [KRYSTA]   0513 Potassium: 3.7 [KRYSTA]   0513 HCG Qualitative: Negative [KRYSTA]   0513 proBNP: 36.8 [KRYSTA]   0513 Troponin T: <0.010 [KRYSTA]   0514 Patient rechecked, no acute distress, discussed plan for outpatient antibiotic use and therapy and her normal cardiac work-up.  Patient expressed understanding and agrees with plan. [KRYSTA]      ED Course User Index  [KRYSTA] Alin Hinds PA       MDM: Patient has been ordered the outpatient antibiotic infusion due to concerns over her immunocompromised status related to her ulcerative colitis.  Her vital signs are stable, oxygen levels are normal, labs, EKG, and chest x-ray show no active disease.  Vital signs are stable, patient is safe for discharge home.    PPE: The patient wore a surgical mask throughout the entire patient encounter. I wore an N95.    AS OF 05:37 EST VITALS:    BP - 105/95  HR - 83  TEMP - 98.7 °F (37.1 °C)  O2 SATS - 98%        DIAGNOSIS  Final diagnoses:   Palpitations   COVID-19 virus infection   History of ulcerative colitis         DISPOSITION  DISCHARGE    Patient discharged in stable condition.    Reviewed implications of results, diagnosis, meds, responsibility to follow up, warning signs and symptoms of possible worsening, potential complications and reasons to return to ER.    Patient/Family voiced understanding of above instructions.    Discussed plan for discharge, as there is no emergent indication for admission. Patient referred to primary care provider for BP  management due to today's BP. Pt/family is agreeable and understands need for follow up and repeat testing.  Pt is aware that discharge does not mean that nothing is wrong but it indicates no emergency is present that requires admission and they must continue care with follow-up as given below or physician of their choice.     FOLLOW-UP  Elise Valero MD  [address] [address]  [phone]    Schedule an appointment as soon as possible for a visit            Medication List      No changes were made to your prescriptions during this visit.                    Alin Hinds PA  01/05/22 0537

## 2022-03-17 ENCOUNTER — OFFICE VISIT (OUTPATIENT)
Dept: GASTROENTEROLOGY | Facility: CLINIC | Age: 47
End: 2022-03-17

## 2022-03-17 VITALS — TEMPERATURE: 97.5 F | BODY MASS INDEX: 30.63 KG/M2 | WEIGHT: 156 LBS | HEIGHT: 60 IN

## 2022-03-17 DIAGNOSIS — Z51.81 THERAPEUTIC DRUG MONITORING: ICD-10-CM

## 2022-03-17 DIAGNOSIS — R15.2 FECAL URGENCY: ICD-10-CM

## 2022-03-17 DIAGNOSIS — K51.00 ULCERATIVE PANCOLITIS WITHOUT COMPLICATION: Primary | ICD-10-CM

## 2022-03-17 DIAGNOSIS — Z86.010 HISTORY OF COLON POLYPS: ICD-10-CM

## 2022-03-17 DIAGNOSIS — Z86.16 HISTORY OF COVID-19: ICD-10-CM

## 2022-03-17 PROCEDURE — 99214 OFFICE O/P EST MOD 30 MIN: CPT | Performed by: NURSE PRACTITIONER

## 2022-03-17 RX ORDER — MESALAMINE 1.2 G/1
TABLET, DELAYED RELEASE ORAL
Qty: 360 TABLET | Refills: 3 | Status: SHIPPED | OUTPATIENT
Start: 2022-03-17 | End: 2022-04-07 | Stop reason: SDUPTHER

## 2022-03-17 NOTE — PROGRESS NOTES
Chief Complaint   Patient presents with   • Ulcerative Colitis   • Frequent bowel movements       Marah Britt is a  46 y.o. female here for a follow up visit for ulcerative colitis.    HPI  46-year-old female presents today for follow-up visit for ulcerative colitis.  She is a patient of Dr. Capellan.  She was last seen by him in the office on 2021.  She has a longstanding history of ulcerative pancolitis diagnosed initially in .  She is currently well controlled on Lialda 4 tabs daily.  She tells me she has had Covid 2 different times.  SHe is vaccinated and has had her booster.  She feels like she has had lingering post Covid symptoms that could be contributing to her frequent bowel movements.  She also admits that she is under a lot of stress and that this could also be contributing as well.  Her last colonoscopy was on 3/15/2021.  At that time the colonoscopy actually looked good and there was no evidence of acute-active inflammation.  Last EGD was in .  She denies any dysphagia, reflux, nausea and vomiting, rectal bleeding or melena.  She admits her appetite is good and her weight has gone up 21 pounds since May of last year.  She does have a maternal grandmother with pancreatic cancer.  This always weighs heavily on her mind and she is always worried about getting pancreatic cancer at some point.  Past Medical History:   Diagnosis Date   • Abnormal Pap smear of cervix ,    • Acne    • ADHD    • Ulcerative colitis (HCC)        Past Surgical History:   Procedure Laterality Date   • AUGMENTATION MAMMAPLASTY     • BREAST AUGMENTATION     •  SECTION     • COLONOSCOPY  2015    left sided ulcerative colitis   • COLONOSCOPY  2011    Probable ulcerative proctitis? with some inflammation around the appendiceal orifice   • COLONOSCOPY N/A 10/22/2018    Normal   • COLONOSCOPY N/A 3/15/2021    Procedure: COLONOSCOPY TO CECUM WITH BIOPSIES;  Surgeon: David Capellan MD;  Location:   [facility];  Service: Gastroenterology;  Laterality: N/A;  PRE- HISTORY ULCERATIVE COLITIS  POST- NORMAL   • UPPER GASTROINTESTINAL ENDOSCOPY  2015    gastritis   • WISDOM TOOTH EXTRACTION         Scheduled Meds:    Continuous Infusions:No current facility-administered medications for this visit.      PRN Meds:.    No Known Allergies    Social History     Socioeconomic History   • Marital status:    Tobacco Use   • Smoking status: Former Smoker     Types: Cigarettes     Quit date:      Years since quittin.2   • Smokeless tobacco: Never Used   • Tobacco comment: smoked some in twenties.   Vaping Use   • Vaping Use: Never used   Substance and Sexual Activity   • Alcohol use: Yes     Comment: rare/socail    • Drug use: No   • Sexual activity: Not Currently     Birth control/protection: OCP       Family History   Problem Relation Age of Onset   • Pancreatic cancer Maternal Grandmother    • Melanoma Sister    • Prostate cancer Maternal Uncle    • Melanoma Maternal Grandfather    • Pulmonary embolism Father    • Breast cancer Neg Hx    • Ovarian cancer Neg Hx    • Uterine cancer Neg Hx    • Colon cancer Neg Hx    • Deep vein thrombosis Neg Hx        Review of Systems   Constitutional: Negative for appetite change, chills, diaphoresis, fatigue, fever and unexpected weight change.   HENT: Negative for nosebleeds, postnasal drip, sore throat, trouble swallowing and voice change.    Respiratory: Negative for cough, choking, chest tightness, shortness of breath, wheezing and stridor.    Cardiovascular: Negative for chest pain, palpitations and leg swelling.   Gastrointestinal: Negative for abdominal distention, abdominal pain, anal bleeding, blood in stool, constipation, diarrhea, nausea, rectal pain and vomiting.   Endocrine: Negative for polydipsia, polyphagia and polyuria.   Musculoskeletal: Negative for gait problem.   Skin: Negative for rash and wound.   Allergic/Immunologic: Negative for food  allergies.   Neurological: Negative for dizziness, speech difficulty and light-headedness.   Psychiatric/Behavioral: Negative for confusion, self-injury, sleep disturbance and suicidal ideas.       Vitals:    03/17/22 1303   Temp: 97.5 °F (36.4 °C)       Physical Exam  Constitutional:       General: She is not in acute distress.     Appearance: She is well-developed. She is not ill-appearing.   HENT:      Head: Normocephalic.   Eyes:      Pupils: Pupils are equal, round, and reactive to light.   Cardiovascular:      Rate and Rhythm: Normal rate and regular rhythm.      Heart sounds: Normal heart sounds.   Pulmonary:      Effort: Pulmonary effort is normal.      Breath sounds: Normal breath sounds.   Abdominal:      General: Bowel sounds are normal. There is no distension.      Palpations: Abdomen is soft. There is no mass.      Tenderness: There is no abdominal tenderness. There is no guarding or rebound.      Hernia: No hernia is present.   Musculoskeletal:         General: Normal range of motion.   Skin:     General: Skin is warm and dry.   Neurological:      Mental Status: She is alert and oriented to person, place, and time.   Psychiatric:         Speech: Speech normal.         Behavior: Behavior normal.         Judgment: Judgment normal.         No radiology results for the last 7 days     Diagnoses and all orders for this visit:    1. Ulcerative pancolitis without complication (HCC) (Primary)  Overview:  Added automatically from request for surgery [id number]    Orders:  -     CBC & Differential  -     Comprehensive Metabolic Panel  -     Amylase  -     Lipase  -     Sedimentation Rate  -     C-reactive Protein    2. Fecal urgency    3. Therapeutic drug monitoring  -     CBC & Differential  -     Comprehensive Metabolic Panel  -     Amylase  -     Lipase  -     Sedimentation Rate  -     C-reactive Protein    4. History of colon polyps    5. History of COVID-19    Other orders  -     mesalamine (Lialda) 1.2 g EC  tablet; TAKE 4 TABLETS BY MOUTH DAILY  Dispense: 360 tablet; Refill: 3     Reviewed most recent colonoscopy results with her today.  Also reviewed her most recent blood work.  Ulcerative pancolitis seems well controlled currently on Lialda 4 tabs daily.  Okay for her to trial 3 tabs daily and see how she does.  We will go ahead and check routine labs today.  Patient to continue a high-fiber diet.  Patient to call the office with any issues.  Patient to follow-up with Dr. Capellan as planned.  Patient is agreeable to the plan.

## 2022-03-18 ENCOUNTER — TELEPHONE (OUTPATIENT)
Dept: GASTROENTEROLOGY | Facility: CLINIC | Age: 47
End: 2022-03-18

## 2022-03-18 LAB
ALBUMIN SERPL-MCNC: 3.9 G/DL (ref 3.8–4.8)
ALBUMIN/GLOB SERPL: 1.4 {RATIO} (ref 1.2–2.2)
ALP SERPL-CCNC: 55 IU/L (ref 44–121)
ALT SERPL-CCNC: 17 IU/L (ref 0–32)
AMYLASE SERPL-CCNC: 64 U/L (ref 31–110)
AST SERPL-CCNC: 18 IU/L (ref 0–40)
BASOPHILS # BLD AUTO: 0 X10E3/UL (ref 0–0.2)
BASOPHILS NFR BLD AUTO: 1 %
BILIRUB SERPL-MCNC: 0.5 MG/DL (ref 0–1.2)
BUN SERPL-MCNC: 14 MG/DL (ref 6–24)
BUN/CREAT SERPL: 18 (ref 9–23)
CALCIUM SERPL-MCNC: 9.2 MG/DL (ref 8.7–10.2)
CHLORIDE SERPL-SCNC: 101 MMOL/L (ref 96–106)
CO2 SERPL-SCNC: 24 MMOL/L (ref 20–29)
CREAT SERPL-MCNC: 0.8 MG/DL (ref 0.57–1)
CRP SERPL-MCNC: 3 MG/L (ref 0–10)
EGFRCR SERPLBLD CKD-EPI 2021: 92 ML/MIN/1.73
EOSINOPHIL # BLD AUTO: 0.1 X10E3/UL (ref 0–0.4)
EOSINOPHIL NFR BLD AUTO: 1 %
ERYTHROCYTE [DISTWIDTH] IN BLOOD BY AUTOMATED COUNT: 12 % (ref 11.7–15.4)
ERYTHROCYTE [SEDIMENTATION RATE] IN BLOOD BY WESTERGREN METHOD: 2 MM/HR (ref 0–32)
GLOBULIN SER CALC-MCNC: 2.8 G/DL (ref 1.5–4.5)
GLUCOSE SERPL-MCNC: 77 MG/DL (ref 65–99)
HCT VFR BLD AUTO: 39.6 % (ref 34–46.6)
HGB BLD-MCNC: 12.8 G/DL (ref 11.1–15.9)
IMM GRANULOCYTES # BLD AUTO: 0 X10E3/UL (ref 0–0.1)
IMM GRANULOCYTES NFR BLD AUTO: 0 %
LIPASE SERPL-CCNC: 27 U/L (ref 14–72)
LYMPHOCYTES # BLD AUTO: 3.1 X10E3/UL (ref 0.7–3.1)
LYMPHOCYTES NFR BLD AUTO: 38 %
MCH RBC QN AUTO: 29.6 PG (ref 26.6–33)
MCHC RBC AUTO-ENTMCNC: 32.3 G/DL (ref 31.5–35.7)
MCV RBC AUTO: 92 FL (ref 79–97)
MONOCYTES # BLD AUTO: 0.6 X10E3/UL (ref 0.1–0.9)
MONOCYTES NFR BLD AUTO: 7 %
NEUTROPHILS # BLD AUTO: 4.3 X10E3/UL (ref 1.4–7)
NEUTROPHILS NFR BLD AUTO: 53 %
PLATELET # BLD AUTO: 330 X10E3/UL (ref 150–450)
POTASSIUM SERPL-SCNC: 4 MMOL/L (ref 3.5–5.2)
PROT SERPL-MCNC: 6.7 G/DL (ref 6–8.5)
RBC # BLD AUTO: 4.32 X10E6/UL (ref 3.77–5.28)
SODIUM SERPL-SCNC: 140 MMOL/L (ref 134–144)
WBC # BLD AUTO: 8.1 X10E3/UL (ref 3.4–10.8)

## 2022-03-18 NOTE — TELEPHONE ENCOUNTER
----- Message from MELISA Perkins sent at 3/18/2022 10:44 AM EDT -----  Please call the patient and let her know her inflammatory markers were perfectly normal.  All labs look good.  Thanks

## 2022-04-07 RX ORDER — MESALAMINE 1.2 G/1
TABLET, DELAYED RELEASE ORAL
Qty: 360 TABLET | Refills: 3 | Status: SHIPPED | OUTPATIENT
Start: 2022-04-07 | End: 2023-03-23

## 2022-04-07 RX ORDER — NORETHINDRONE ACETATE AND ETHINYL ESTRADIOL, ETHINYL ESTRADIOL AND FERROUS FUMARATE 1MG-10(24)
1 KIT ORAL DAILY
Qty: 84 TABLET | Refills: 0 | Status: SHIPPED | OUTPATIENT
Start: 2022-04-07 | End: 2022-05-16 | Stop reason: SDUPTHER

## 2022-05-16 ENCOUNTER — OFFICE VISIT (OUTPATIENT)
Dept: OBSTETRICS AND GYNECOLOGY | Facility: CLINIC | Age: 47
End: 2022-05-16

## 2022-05-16 VITALS
WEIGHT: 156.8 LBS | SYSTOLIC BLOOD PRESSURE: 127 MMHG | BODY MASS INDEX: 30.78 KG/M2 | HEART RATE: 76 BPM | HEIGHT: 60 IN | DIASTOLIC BLOOD PRESSURE: 89 MMHG

## 2022-05-16 DIAGNOSIS — Z01.419 WELL WOMAN EXAM WITH ROUTINE GYNECOLOGICAL EXAM: Primary | ICD-10-CM

## 2022-05-16 PROBLEM — F90.2 ADHD (ATTENTION DEFICIT HYPERACTIVITY DISORDER), COMBINED TYPE: Status: ACTIVE | Noted: 2018-03-21

## 2022-05-16 PROBLEM — F43.10 PTSD (POST-TRAUMATIC STRESS DISORDER): Status: ACTIVE | Noted: 2018-03-21

## 2022-05-16 PROBLEM — Z82.49 FAMILY HISTORY OF HEART DISEASE IN MALE FAMILY MEMBER BEFORE AGE 55: Status: ACTIVE | Noted: 2022-05-16

## 2022-05-16 PROCEDURE — 99396 PREV VISIT EST AGE 40-64: CPT | Performed by: OBSTETRICS & GYNECOLOGY

## 2022-05-16 RX ORDER — NORETHINDRONE ACETATE AND ETHINYL ESTRADIOL, ETHINYL ESTRADIOL AND FERROUS FUMARATE 1MG-10(24)
1 KIT ORAL DAILY
Qty: 84 TABLET | Refills: 4 | Status: SHIPPED | OUTPATIENT
Start: 2022-05-16 | End: 2022-09-06

## 2022-05-16 NOTE — PROGRESS NOTES
Chief Complaint   Patient presents with   • Annual Exam     Pt presents today for annual exam. Last annual and pap smear- 05/10/2021 and last mammogram- 2021        Marah Britt is a 46 y.o.  who presents for an annual examination.   Reports 3 weeks of a red indention on the roof of her mouth that is painful, starting to get better.  She has not seen anyone for this before or had lesions like this before . Her father passed away in February and she thought it may be stress related.   Pap history:  Last pap: May 2021 - NIL, HPV negative; 2020 NIL  Prior abnormal paps: yes, had biopsy in 2018  Colonoscopy:  Yes, treated for UC, follows with GI  Mammogram: Dec 2021, due 2022  STDs  Sexually active: yes, has negative testing previously  History of STDs: no  Periods:   On LoLoestrin (reports that it has improved her mental health)   Denies h/o VTE in legs or lungs; non smoker    Is patient's family or personal history significant for any risks for BRCA? no    Past Medical History:   Diagnosis Date   • Abnormal Pap smear of cervix 2019   • Acne    • ADHD    • Ulcerative colitis (HCC)      Past Surgical History:   Procedure Laterality Date   • AUGMENTATION MAMMAPLASTY     • BREAST AUGMENTATION     •  SECTION     • COLONOSCOPY  2015    left sided ulcerative colitis   • COLONOSCOPY  2011    Probable ulcerative proctitis? with some inflammation around the appendiceal orifice   • COLONOSCOPY N/A 10/22/2018    Normal   • COLONOSCOPY N/A 3/15/2021    Procedure: COLONOSCOPY TO CECUM WITH BIOPSIES;  Surgeon: David Capellan MD;  Location: [facility];  Service: Gastroenterology;  Laterality: N/A;  PRE- HISTORY ULCERATIVE COLITIS  POST- NORMAL   • UPPER GASTROINTESTINAL ENDOSCOPY  2015    gastritis   • WISDOM TOOTH EXTRACTION       OB History    Para Term  AB Living   1 1 1     1   SAB IAB Ectopic Molar Multiple Live Births             1      #  Outcome Date GA Lbr Kvng/2nd Weight Sex Delivery Anes PTL Lv   1 Term      CS-Unspec   RACHID     Social History     Tobacco Use   • Smoking status: Former Smoker     Types: Cigarettes     Quit date:      Years since quittin.3   • Smokeless tobacco: Never Used   • Tobacco comment: smoked some in twenties.   Vaping Use   • Vaping Use: Never used   Substance Use Topics   • Alcohol use: Yes     Comment: rare/socail    • Drug use: No     Family History   Problem Relation Age of Onset   • Pancreatic cancer Maternal Grandmother    • Melanoma Sister    • Prostate cancer Maternal Uncle    • Melanoma Maternal Grandfather    • Pulmonary embolism Father    • Breast cancer Neg Hx    • Ovarian cancer Neg Hx    • Uterine cancer Neg Hx    • Colon cancer Neg Hx    • Deep vein thrombosis Neg Hx      Current Outpatient Medications on File Prior to Visit   Medication Sig Dispense Refill   • amphetamine-dextroamphetamine XR (ADDERALL XR) 10 MG 24 hr capsule      • BIOTIN PO Take  by mouth.     • busPIRone (BUSPAR) 5 MG tablet Take 5 mg by mouth 1 (One) Time As Needed.     • mesalamine (Lialda) 1.2 g EC tablet TAKE 4 TABLETS BY MOUTH DAILY 360 tablet 3   • montelukast (SINGULAIR) 10 MG tablet TAKE 1 TABLET BY MOUTH EVERY NIGHT     • spironolactone (ALDACTONE) 25 MG tablet TK 1 TO 2 TS PO D  0   • tretinoin (RETIN-A) 0.05 % cream      • VITAMIN D PO Take  by mouth Daily.     • [DISCONTINUED] Norethin-Eth Estrad-Fe Biphas (Lo Loestrin Fe) 1 MG-10 MCG / 10 MCG tablet Take 1 tablet by mouth Daily. 84 tablet 0     No current facility-administered medications on file prior to visit.     No Known Allergies     Review of Systems   Constitutional: Negative.    HENT: Negative.    Respiratory: Negative.    Cardiovascular: Negative.    Gastrointestinal: Negative.    Endocrine: Negative.    Genitourinary: Negative.    Musculoskeletal: Negative.    Skin: Negative.    Neurological: Negative.    Psychiatric/Behavioral: Negative.   "      OBJECTIVE:   Vitals:    05/16/22 1018   BP: 127/89   Pulse: 76   Weight: 71.1 kg (156 lb 12.8 oz)   Height: 152.4 cm (60\")      Physical Exam  Exam conducted with a chaperone present.   Constitutional:       General: She is not in acute distress.     Appearance: She is well-developed. She is not diaphoretic.   HENT:      Head: Normocephalic and atraumatic.   Neck:      Thyroid: No thyromegaly.      Trachea: No tracheal deviation.   Cardiovascular:      Rate and Rhythm: Normal rate.      Heart sounds: Normal heart sounds. No murmur heard.    No friction rub. No gallop.   Pulmonary:      Effort: Pulmonary effort is normal. No respiratory distress.      Breath sounds: Normal breath sounds.   Chest:      Chest wall: No tenderness.   Breasts:      Right: No inverted nipple, mass, nipple discharge, skin change or tenderness.      Left: No inverted nipple, mass, nipple discharge, skin change or tenderness.       Abdominal:      General: There is no distension.      Palpations: Abdomen is soft. There is no mass.      Tenderness: There is no abdominal tenderness.   Genitourinary:     General: Normal vulva.      Labia:         Right: No rash, lesion or injury.         Left: No rash, lesion or injury.       Vagina: No vaginal discharge, tenderness or bleeding.      Cervix: No cervical motion tenderness, discharge or friability.      Uterus: Not deviated, not enlarged, not fixed and not tender.       Adnexa:         Right: No mass, tenderness or fullness.          Left: No mass, tenderness or fullness.     Musculoskeletal:         General: No deformity. Normal range of motion.   Lymphadenopathy:      Cervical: No cervical adenopathy.   Skin:     General: Skin is warm and dry.      Findings: No rash.   Neurological:      Mental Status: She is alert and oriented to person, place, and time.   Psychiatric:         Behavior: Behavior normal.         Thought Content: Thought content normal.         Judgment: Judgment normal. "         ASSESSMENT/PLAN:  Annual well woman visit:  Cervical cancer screening:    Reports  h/o low grade cervical dysplasia   The patient would like to repeat pap smear today    Screening guidelines discussed with patient  Breast cancer screening:    Mammogram due in June 2022   Breast self awareness encouraged  Colonscopy:   Follows with GI due to h/o UC - March 2021  Family history    does not demonstrate need for genetics referral   Healthy lifestyle counseling:   return for routine annual checkups   Reviewed limitations of HSV screening     Reviewed risks/benefits of oral contraceptive pill - she reports her moods are wonderful on this.  Reviewed menopausal transition    BMI Counseling  Body mass index is 30.62 kg/m².

## 2022-05-18 LAB
C TRACH RRNA SPEC QL NAA+PROBE: NEGATIVE
N GONORRHOEA RRNA SPEC QL NAA+PROBE: NEGATIVE
T VAGINALIS RRNA SPEC QL NAA+PROBE: NEGATIVE

## 2022-05-23 LAB — M GENITALIUM DNA SPEC QL NAA+PROBE: NEGATIVE

## 2022-06-14 DIAGNOSIS — Z09 FOLLOW-UP EXAM, 3-6 MONTHS SINCE PREVIOUS EXAM: Primary | ICD-10-CM

## 2022-06-14 DIAGNOSIS — N63.20 MASS OF LEFT BREAST, UNSPECIFIED QUADRANT: ICD-10-CM

## 2022-06-15 ENCOUNTER — TELEPHONE (OUTPATIENT)
Dept: OBSTETRICS AND GYNECOLOGY | Facility: CLINIC | Age: 47
End: 2022-06-15

## 2022-06-15 NOTE — TELEPHONE ENCOUNTER
Referral for 6 mth follow up was forwarded to [facility] to call and latanya POLLARD Bilat Mammo & Left BR US. Appt is 6/30/22 @ 1pm.  SR

## 2022-06-27 ENCOUNTER — OFFICE VISIT (OUTPATIENT)
Dept: GASTROENTEROLOGY | Facility: CLINIC | Age: 47
End: 2022-06-27

## 2022-06-27 VITALS
BODY MASS INDEX: 31.1 KG/M2 | DIASTOLIC BLOOD PRESSURE: 60 MMHG | TEMPERATURE: 97.7 F | HEIGHT: 60 IN | WEIGHT: 158.4 LBS | OXYGEN SATURATION: 98 % | SYSTOLIC BLOOD PRESSURE: 96 MMHG

## 2022-06-27 DIAGNOSIS — Z83.71 FH: COLON POLYPS: ICD-10-CM

## 2022-06-27 DIAGNOSIS — K51.00 ULCERATIVE PANCOLITIS WITHOUT COMPLICATION: Primary | ICD-10-CM

## 2022-06-27 DIAGNOSIS — Z51.81 THERAPEUTIC DRUG MONITORING: ICD-10-CM

## 2022-06-27 DIAGNOSIS — K63.5 POLYP OF COLON, UNSPECIFIED PART OF COLON, UNSPECIFIED TYPE: ICD-10-CM

## 2022-06-27 PROCEDURE — 99214 OFFICE O/P EST MOD 30 MIN: CPT | Performed by: NURSE PRACTITIONER

## 2022-06-27 RX ORDER — CETIRIZINE HYDROCHLORIDE 10 MG/1
TABLET ORAL
COMMUNITY
Start: 2022-06-05

## 2022-06-27 NOTE — PROGRESS NOTES
Chief Complaint   Patient presents with   • Ulcerative Colitis       Marah Britt is a  46 y.o. female here for a follow up visit for ulcerative colitis.    HPI  46-year-old female presents today for follow-up visit for ulcerative colitis.  She is a patient of Dr. Capellan.  She was last seen in the office by me on 3/17/2022.  She was initially diagnosed with ulcerative pancolitis in .  Her last colonoscopy was on 3/15/2021.  Her last EGD was in .  She does have a history of colon polyps.  She tells me she has been doing really well on Lialda for her UC.  She is really been trying to wean herself down off the dosage.  Initially she was taking 4 daily and now she is down to 2 or 3 most days of the week.  She tells me if she starts to feel like she is having any kind of flaring symptoms she will go back up to 3 or 4 a day as needed.  She denies any dysphagia, reflux, nausea and vomiting, diarrhea, rectal bleeding or melena.  She admits her appetite is good and her weight is up 8 pounds since January of this year.She does have a family history of pancreatic cancer with a maternal grandmother.  She tells me she is up-to-date with all of her health screenings and vaccinations.  Past Medical History:   Diagnosis Date   • Abnormal Pap smear of cervix ,    • Acne    • ADHD    • Ulcerative colitis (HCC)        Past Surgical History:   Procedure Laterality Date   • AUGMENTATION MAMMAPLASTY     • BREAST AUGMENTATION     •  SECTION     • COLONOSCOPY  2015    left sided ulcerative colitis   • COLONOSCOPY  2011    Probable ulcerative proctitis? with some inflammation around the appendiceal orifice   • COLONOSCOPY N/A 10/22/2018    Normal   • COLONOSCOPY N/A 3/15/2021    Procedure: COLONOSCOPY TO CECUM WITH BIOPSIES;  Surgeon: David Capellan MD;  Location: [facility] ENDOSCOPY;  Service: Gastroenterology;  Laterality: N/A;  PRE- HISTORY ULCERATIVE COLITIS  POST- NORMAL   • UPPER GASTROINTESTINAL  ENDOSCOPY  2015    gastritis   • WISDOM TOOTH EXTRACTION         Scheduled Meds:    Continuous Infusions:No current facility-administered medications for this visit.      PRN Meds:.    No Known Allergies    Social History     Socioeconomic History   • Marital status:    Tobacco Use   • Smoking status: Former Smoker     Types: Cigarettes     Quit date: 2006     Years since quittin.5   • Smokeless tobacco: Never Used   • Tobacco comment: smoked some in twenties.   Vaping Use   • Vaping Use: Never used   Substance and Sexual Activity   • Alcohol use: Yes     Comment: rare/socail    • Drug use: No   • Sexual activity: Not Currently     Birth control/protection: OCP       Family History   Problem Relation Age of Onset   • Pancreatic cancer Maternal Grandmother    • Melanoma Sister    • Prostate cancer Maternal Uncle    • Melanoma Maternal Grandfather    • Pulmonary embolism Father    • Breast cancer Neg Hx    • Ovarian cancer Neg Hx    • Uterine cancer Neg Hx    • Colon cancer Neg Hx    • Deep vein thrombosis Neg Hx        Review of Systems   Constitutional: Negative for appetite change, chills, diaphoresis, fatigue, fever and unexpected weight change.   HENT: Negative for nosebleeds, postnasal drip, sore throat, trouble swallowing and voice change.    Respiratory: Negative for cough, choking, chest tightness, shortness of breath, wheezing and stridor.    Cardiovascular: Negative for chest pain, palpitations and leg swelling.   Gastrointestinal: Negative for abdominal distention, abdominal pain, anal bleeding, blood in stool, constipation, diarrhea, nausea, rectal pain and vomiting.   Endocrine: Negative for polydipsia, polyphagia and polyuria.   Musculoskeletal: Negative for gait problem.   Skin: Negative for rash and wound.   Allergic/Immunologic: Negative for food allergies.   Neurological: Negative for dizziness, speech difficulty and light-headedness.   Psychiatric/Behavioral: Negative for  confusion, self-injury, sleep disturbance and suicidal ideas. The patient is nervous/anxious.        Vitals:    06/27/22 0909   BP: 96/60   Temp: 97.7 °F (36.5 °C)   SpO2: 98%       Physical Exam  Constitutional:       General: She is not in acute distress.     Appearance: She is well-developed. She is not ill-appearing.   HENT:      Head: Normocephalic.   Eyes:      Pupils: Pupils are equal, round, and reactive to light.   Cardiovascular:      Rate and Rhythm: Normal rate and regular rhythm.      Heart sounds: Normal heart sounds.   Pulmonary:      Effort: Pulmonary effort is normal.      Breath sounds: Normal breath sounds.   Abdominal:      General: Bowel sounds are normal. There is no distension.      Palpations: Abdomen is soft. There is no mass.      Tenderness: There is no abdominal tenderness. There is no guarding or rebound.      Hernia: No hernia is present.   Musculoskeletal:         General: Normal range of motion.   Skin:     General: Skin is warm and dry.   Neurological:      Mental Status: She is alert and oriented to person, place, and time.   Psychiatric:         Speech: Speech normal.         Behavior: Behavior normal.         Judgment: Judgment normal.         No radiology results for the last 7 days     Diagnoses and all orders for this visit:    1. Ulcerative pancolitis without complication (HCC) (Primary)  Overview:  Added automatically from request for surgery [id number]    Formatting of this note might be different from the original.  Overview:   Added automatically from request for surgery [id number]      2. Polyp of colon, unspecified part of colon, unspecified type  Overview:  Added automatically from request for surgery [id number]      3. FH: colon polyps  Overview:  Added automatically from request for surgery [id number]      4. Therapeutic drug monitoring      Reviewed most recent labs with her today.  Labs were stable.  Inflammatory markers were normal.  Doing really well currently.  Continue to  wean down on the Lialda to 2 daily.  I did advise her if she started having flaring symptoms to go right back up on 4 daily.  Next screening colonoscopy will be due next March.  Patient to call the office with any issues.  Patient to follow-up with me in 6 months.  Patient is agreeable to the plan.

## 2022-06-30 ENCOUNTER — APPOINTMENT (OUTPATIENT)
Dept: WOMENS IMAGING | Facility: HOSPITAL | Age: 47
End: 2022-06-30

## 2022-06-30 PROCEDURE — 76641 ULTRASOUND BREAST COMPLETE: CPT | Performed by: RADIOLOGY

## 2022-06-30 PROCEDURE — 77062 BREAST TOMOSYNTHESIS BI: CPT | Performed by: RADIOLOGY

## 2022-06-30 PROCEDURE — 77066 DX MAMMO INCL CAD BI: CPT | Performed by: RADIOLOGY

## 2022-06-30 PROCEDURE — G0279 TOMOSYNTHESIS, MAMMO: HCPCS | Performed by: RADIOLOGY

## 2022-07-05 DIAGNOSIS — Z09 FOLLOW-UP EXAM, 3-6 MONTHS SINCE PREVIOUS EXAM: ICD-10-CM

## 2022-07-05 DIAGNOSIS — N63.20 MASS OF LEFT BREAST, UNSPECIFIED QUADRANT: ICD-10-CM

## 2022-07-06 ENCOUNTER — TELEPHONE (OUTPATIENT)
Dept: OBSTETRICS AND GYNECOLOGY | Facility: CLINIC | Age: 47
End: 2022-07-06

## 2022-07-06 NOTE — TELEPHONE ENCOUNTER
----- Message from Pamela Belcher MD sent at 7/6/2022  8:11 AM EDT -----  Please let patient know that her breast imaging was benign (negative); she should be receiving a letter as well.  Return to screening mammogram. Thanks!

## 2022-08-17 ENCOUNTER — TELEPHONE (OUTPATIENT)
Dept: OBSTETRICS AND GYNECOLOGY | Facility: CLINIC | Age: 47
End: 2022-08-17

## 2022-09-01 ENCOUNTER — TELEPHONE (OUTPATIENT)
Dept: OBSTETRICS AND GYNECOLOGY | Facility: CLINIC | Age: 47
End: 2022-09-01

## 2022-09-01 RX ORDER — FLUCONAZOLE 150 MG/1
150 TABLET ORAL ONCE
Qty: 1 TABLET | Refills: 0 | Status: SHIPPED | OUTPATIENT
Start: 2022-09-01 | End: 2022-11-14

## 2022-09-01 NOTE — TELEPHONE ENCOUNTER
Patient is calling and believes she has yeast infection. She is asking if provider could send rx to her pharmacy.    Pharmacy confirmed - [id number] - [place], [place] - [address] [facility] [address] [facility] [phone] [facility] [phone] FX    Please advise,  Briana

## 2022-09-06 ENCOUNTER — TELEPHONE (OUTPATIENT)
Dept: OBSTETRICS AND GYNECOLOGY | Facility: CLINIC | Age: 47
End: 2022-09-06

## 2022-09-06 RX ORDER — NORETHINDRONE ACETATE AND ETHINYL ESTRADIOL, ETHINYL ESTRADIOL AND FERROUS FUMARATE 1MG-10(24)
1 KIT ORAL DAILY
Qty: 84 TABLET | Refills: 2 | Status: SHIPPED | OUTPATIENT
Start: 2022-09-06 | End: 2022-09-06 | Stop reason: SDUPTHER

## 2022-09-06 RX ORDER — NORETHINDRONE ACETATE AND ETHINYL ESTRADIOL, ETHINYL ESTRADIOL AND FERROUS FUMARATE 1MG-10(24)
1 KIT ORAL DAILY
Qty: 84 TABLET | Refills: 2 | Status: SHIPPED | OUTPATIENT
Start: 2022-09-06

## 2022-09-06 NOTE — TELEPHONE ENCOUNTER
Luana,     Pharmacy fax for refill.   Rx sent     Find out situation. Hard to tell if she was supposed to stay on medication. Note in Dr. Hair annual in May that she was doing well on it, but no note to continue?    May need to recheck with Dr. Belcher for her plan.     Thanks,   Dr. Bradford

## 2022-11-14 RX ORDER — FLUCONAZOLE 150 MG/1
TABLET ORAL
Qty: 1 TABLET | Refills: 0 | Status: SHIPPED | OUTPATIENT
Start: 2022-11-14

## 2022-11-14 NOTE — TELEPHONE ENCOUNTER
Left message for Marah that an Rx for Diflucan has been sent to your [facility] pharmacy at [address] for your yeast infection. Hope you feel better. Thank you.

## 2022-11-14 NOTE — TELEPHONE ENCOUNTER
Med refill. Ye pt. AE 5/16/22. Requesting refill of Diflucan.  [facility] pharmacy on file. Thank you.

## 2022-12-29 ENCOUNTER — DOCUMENTATION (OUTPATIENT)
Dept: GASTROENTEROLOGY | Facility: CLINIC | Age: 47
End: 2022-12-29
Payer: MEDICAID

## 2023-01-05 ENCOUNTER — OFFICE VISIT (OUTPATIENT)
Dept: GASTROENTEROLOGY | Facility: CLINIC | Age: 48
End: 2023-01-05
Payer: MEDICAID

## 2023-01-05 ENCOUNTER — TELEPHONE (OUTPATIENT)
Dept: GASTROENTEROLOGY | Facility: CLINIC | Age: 48
End: 2023-01-05
Payer: MEDICAID

## 2023-01-05 VITALS
WEIGHT: 152.6 LBS | TEMPERATURE: 96.9 F | SYSTOLIC BLOOD PRESSURE: 117 MMHG | BODY MASS INDEX: 29.96 KG/M2 | HEART RATE: 84 BPM | HEIGHT: 60 IN | DIASTOLIC BLOOD PRESSURE: 85 MMHG

## 2023-01-05 DIAGNOSIS — Z83.71 FH: COLON POLYPS: ICD-10-CM

## 2023-01-05 DIAGNOSIS — K29.70 HELICOBACTER PYLORI GASTRITIS: ICD-10-CM

## 2023-01-05 DIAGNOSIS — K51.00 ULCERATIVE PANCOLITIS WITHOUT COMPLICATION: Primary | ICD-10-CM

## 2023-01-05 DIAGNOSIS — B96.81 HELICOBACTER PYLORI GASTRITIS: ICD-10-CM

## 2023-01-05 DIAGNOSIS — K63.5 POLYP OF COLON, UNSPECIFIED PART OF COLON, UNSPECIFIED TYPE: ICD-10-CM

## 2023-01-05 PROCEDURE — 99214 OFFICE O/P EST MOD 30 MIN: CPT | Performed by: INTERNAL MEDICINE

## 2023-01-05 RX ORDER — MESALAMINE 1000 MG/1
1000 SUPPOSITORY RECTAL NIGHTLY
Qty: 30 SUPPOSITORY | Refills: 11 | Status: SHIPPED | OUTPATIENT
Start: 2023-01-05 | End: 2023-02-16

## 2023-01-05 RX ORDER — FLUTICASONE PROPIONATE 50 MCG
1 SPRAY, SUSPENSION (ML) NASAL DAILY
COMMUNITY
Start: 2022-11-15

## 2023-01-05 NOTE — PROGRESS NOTES
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  Her last colonoscopy was on 3/15/2021.  Her last EGD was in .  She does have a history of colon polyps.  She tells me she has been doing really well on Lialda 4/day (she may try to decrease to 2-3 pills/day) for her UC.  She feels the Canasa Suppositories q HS help.        She is not doing well. She feels she if flairing. No rectal bleeding or melena. Some mucous and more requent BM's. She has 3-10 BM/day (she had 3 BM yesterday). No abdominal pain or chest pain. NO fevers, chills. She has gained weight.         We treated her for h. Pylori in the past. She wants to get another breath test.     Past Medical History:   Diagnosis Date   • Abnormal Pap smear of cervix ,    • Acne    • ADHD    • Ulcerative colitis (HCC)        Past Surgical History:   Procedure Laterality Date   • AUGMENTATION MAMMAPLASTY     • BREAST AUGMENTATION     •  SECTION     • COLONOSCOPY  2015    left sided ulcerative colitis   • COLONOSCOPY  2011    Probable ulcerative proctitis? with some inflammation around the appendiceal orifice   • COLONOSCOPY N/A 10/22/2018    Normal   • COLONOSCOPY N/A 3/15/2021    Procedure: COLONOSCOPY TO CECUM WITH BIOPSIES;  Surgeon: David Capellan MD;  Location: [facility] ENDOSCOPY;  Service: Gastroenterology;  Laterality: N/A;  PRE- HISTORY ULCERATIVE COLITIS  POST- NORMAL   • UPPER GASTROINTESTINAL ENDOSCOPY  2015    gastritis   • WISDOM TOOTH EXTRACTION           Current Outpatient Medications:   •  amphetamine-dextroamphetamine XR (ADDERALL XR) 10 MG 24 hr capsule, , Disp: , Rfl:   •  BIOTIN PO, Take  by mouth., Disp: , Rfl:   •  cetirizine (zyrTEC) 10 MG tablet, TAKE 1 TABLET BY MOUTH EVERY DAY AS NEEDED FOR ALLERGIES OR RUNNY NOSE, Disp: , Rfl:   •  fluticasone (FLONASE) 50 MCG/ACT nasal  spray, 1 spray by Each Nare route Daily. Shake before using., Disp: , Rfl:   •  mesalamine (Lialda) 1.2 g EC tablet, TAKE 4 TABLETS BY MOUTH DAILY, Disp: 360 tablet, Rfl: 3  •  montelukast (SINGULAIR) 10 MG tablet, TAKE 1 TABLET BY MOUTH EVERY NIGHT, Disp: , Rfl:   •  NON FORMULARY, [facility], Disp: , Rfl:   •  Norethin-Eth Estrad-Fe Biphas (Lo Loestrin Fe) 1 MG-10 MCG / 10 MCG tablet, Take 1 tablet by mouth Daily., Disp: 84 tablet, Rfl: 2  •  spironolactone (ALDACTONE) 25 MG tablet, TK 1 TO 2 TS PO D, Disp: , Rfl: 0  •  tretinoin (RETIN-A) 0.05 % cream, , Disp: , Rfl:   •  VITAMIN D PO, Take  by mouth Daily., Disp: , Rfl:   •  fluconazole (DIFLUCAN) 150 MG tablet, TAKE 1 TABLET BY MOUTH 1 TIME FOR 1 DOSE, Disp: 1 tablet, Rfl: 0  •  mesalamine (CANASA) 1000 MG suppository, Insert 1 suppository into the rectum Every Night for 42 days., Disp: 30 suppository, Rfl: 11    No Known Allergies    Family History   Problem Relation Age of Onset   • Pancreatic cancer Maternal Grandmother    • Melanoma Sister    • Prostate cancer Maternal Uncle    • Melanoma Maternal Grandfather    • Pulmonary embolism Father    • Breast cancer Neg Hx    • Ovarian cancer Neg Hx    • Uterine cancer Neg Hx    • Colon cancer Neg Hx    • Deep vein thrombosis Neg Hx        Social History     Socioeconomic History   • Marital status:    Tobacco Use   • Smoking status: Former     Types: Cigarettes     Quit date:      Years since quittin.0   • Smokeless tobacco: Never   • Tobacco comments:     smoked some in twenties.   Vaping Use   • Vaping Use: Never used   Substance and Sexual Activity   • Alcohol use: Yes     Comment: rare/socail    • Drug use: No   • Sexual activity: Not Currently     Birth control/protection: OCP       Review of Systems   Gastrointestinal: Positive for diarrhea.   All other systems reviewed and are negative.    Pertinent positives and negatives documented in the HPI and all other systems reviewed and were  found to be negative.  Vitals:    01/05/23 1135   BP: 117/85   Pulse: 84   Temp: 96.9 °F (36.1 °C)       Physical Exam  Vitals reviewed.   Constitutional:       General: She is not in acute distress.     Appearance: Normal appearance. She is well-developed. She is not diaphoretic.   HENT:      Head: Normocephalic and atraumatic. Hair is normal.      Right Ear: Hearing, tympanic membrane, ear canal and external ear normal. No decreased hearing noted. No drainage.      Left Ear: Hearing, tympanic membrane, ear canal and external ear normal. No decreased hearing noted.      Nose: Nose normal. No nasal deformity.      Mouth/Throat:      Mouth: Mucous membranes are moist.   Eyes:      General: Lids are normal.         Right eye: No discharge.         Left eye: No discharge.      Extraocular Movements: Extraocular movements intact.      Conjunctiva/sclera: Conjunctivae normal.      Pupils: Pupils are equal, round, and reactive to light.   Neck:      Thyroid: No thyromegaly.      Vascular: No JVD.      Trachea: No tracheal deviation.   Cardiovascular:      Rate and Rhythm: Normal rate and regular rhythm.      Pulses: Normal pulses.      Heart sounds: Normal heart sounds. No murmur heard.    No friction rub. No gallop.   Pulmonary:      Effort: Pulmonary effort is normal. No respiratory distress.      Breath sounds: Normal breath sounds. No wheezing or rales.   Chest:      Chest wall: No tenderness.   Abdominal:      General: Bowel sounds are normal. There is no distension.      Palpations: Abdomen is soft. There is no mass.      Tenderness: There is no abdominal tenderness. There is no guarding or rebound.      Hernia: No hernia is present.   Musculoskeletal:         General: No tenderness or deformity. Normal range of motion.      Cervical back: Normal range of motion and neck supple.   Lymphadenopathy:      Cervical: No cervical adenopathy.   Skin:     General: Skin is warm and dry.      Findings: No erythema or rash.    Neurological:      Mental Status: She is alert and oriented to person, place, and time.      Cranial Nerves: No cranial nerve deficit.      Motor: No abnormal muscle tone.      Coordination: Coordination normal.      Deep Tendon Reflexes: Reflexes are normal and symmetric. Reflexes normal.   Psychiatric:         Mood and Affect: Mood normal.         Behavior: Behavior normal.         Thought Content: Thought content normal.         Judgment: Judgment normal.         Diagnoses and all orders for this visit:    1. Ulcerative pancolitis without complication (HCC) (Primary)  -     mesalamine (CANASA) 1000 MG suppository; Insert 1 suppository into the rectum Every Night for 42 days.  Dispense: 30 suppository; Refill: 11  -     Case Request; Standing  -     Case Request    2. Helicobacter pylori gastritis  -     H. Pylori Breath Test - Breath, Lung    3. Polyp of colon, unspecified part of colon, unspecified type  -     Case Request; Standing  -     Case Request    4. FH: colon polyps  -     Case Request; Standing  -     Case Request    Other orders  -     Follow Anesthesia Guidelines / Protocol; Future  -     Obtain Informed Consent; Future  -     Implement Anesthesia orders day of procedure.; Standing  -     Obtain informed consent; Standing  -     Verify bowel prep was successful; Standing  -     Give tap water enema if bowel prep was insufficient; Standing      Assessment:  1. ulcerative pancolitis in 2009  2.  H/o helicobacter pylori gastritis  3.     Recommendations:  1. Colonoscopy  2. H. pylori breath test today.  3.     No follow-ups on file.    David Capellan MD  1/5/2023

## 2023-01-05 NOTE — TELEPHONE ENCOUNTER
OK FOR HUB TO READ  JOANNA patient via telephone for COLONOSCOPY. Scheduled 3/21/23 with arrival time of 1230PM. Prep paperwork mailed to verified address on file. Patient advised arrival time may change based on [facility] guidelines. JOANNA CRUM

## 2023-01-06 LAB — UREA BREATH TEST QL: NEGATIVE

## 2023-01-08 NOTE — PROGRESS NOTES
01/08/23       Tell her that her Helicobacter pylori Breath Test came back negative, which means that you do not have Helicobacter pylori lining your stomach, which is good. Send a copy of this report to her PCP.  Fernandez agosto

## 2023-01-18 ENCOUNTER — TELEPHONE (OUTPATIENT)
Dept: GASTROENTEROLOGY | Facility: CLINIC | Age: 48
End: 2023-01-18
Payer: MEDICAID

## 2023-02-17 RX ORDER — MESALAMINE 1.2 G/1
TABLET, DELAYED RELEASE ORAL
Qty: 120 TABLET | OUTPATIENT
Start: 2023-02-17

## 2023-03-21 ENCOUNTER — ANESTHESIA (OUTPATIENT)
Dept: GASTROENTEROLOGY | Facility: HOSPITAL | Age: 48
End: 2023-03-21
Payer: MEDICAID

## 2023-03-21 ENCOUNTER — HOSPITAL ENCOUNTER (OUTPATIENT)
Facility: HOSPITAL | Age: 48
Setting detail: HOSPITAL OUTPATIENT SURGERY
Discharge: HOME | End: 2023-03-21
Attending: INTERNAL MEDICINE | Admitting: INTERNAL MEDICINE
Payer: MEDICAID

## 2023-03-21 ENCOUNTER — ANESTHESIA EVENT (OUTPATIENT)
Dept: GASTROENTEROLOGY | Facility: HOSPITAL | Age: 48
End: 2023-03-21
Payer: MEDICAID

## 2023-03-21 VITALS
HEART RATE: 68 BPM | WEIGHT: 149 LBS | OXYGEN SATURATION: 98 % | RESPIRATION RATE: 16 BRPM | DIASTOLIC BLOOD PRESSURE: 60 MMHG | HEIGHT: 60 IN | SYSTOLIC BLOOD PRESSURE: 100 MMHG | BODY MASS INDEX: 29.25 KG/M2

## 2023-03-21 DIAGNOSIS — K51.00 ULCERATIVE PANCOLITIS WITHOUT COMPLICATION: ICD-10-CM

## 2023-03-21 DIAGNOSIS — K63.5 POLYP OF COLON, UNSPECIFIED PART OF COLON, UNSPECIFIED TYPE: ICD-10-CM

## 2023-03-21 DIAGNOSIS — Z83.71 FH: COLON POLYPS: ICD-10-CM

## 2023-03-21 LAB
ALBUMIN SERPL-MCNC: 4.2 G/DL (ref 3.5–5.2)
ALBUMIN/GLOB SERPL: 1.4 G/DL
ALP SERPL-CCNC: 70 U/L (ref 39–117)
ALT SERPL W P-5'-P-CCNC: 15 U/L (ref 1–33)
ANION GAP SERPL CALCULATED.3IONS-SCNC: 11.9 MMOL/L (ref 5–15)
AST SERPL-CCNC: 18 U/L (ref 1–32)
B-HCG UR QL: NEGATIVE
BASOPHILS # BLD AUTO: 0.03 10*3/MM3 (ref 0–0.2)
BASOPHILS NFR BLD AUTO: 0.3 % (ref 0–1.5)
BILIRUB SERPL-MCNC: 0.5 MG/DL (ref 0–1.2)
BUN SERPL-MCNC: 8 MG/DL (ref 6–20)
BUN/CREAT SERPL: 11.8 (ref 7–25)
CALCIUM SPEC-SCNC: 9.1 MG/DL (ref 8.6–10.5)
CHLORIDE SERPL-SCNC: 103 MMOL/L (ref 98–107)
CO2 SERPL-SCNC: 24.1 MMOL/L (ref 22–29)
CREAT SERPL-MCNC: 0.68 MG/DL (ref 0.57–1)
CRP SERPL-MCNC: 0.44 MG/DL (ref 0–0.5)
DEPRECATED RDW RBC AUTO: 36.2 FL (ref 37–54)
EGFRCR SERPLBLD CKD-EPI 2021: 108.3 ML/MIN/1.73
EOSINOPHIL # BLD AUTO: 0.05 10*3/MM3 (ref 0–0.4)
EOSINOPHIL NFR BLD AUTO: 0.6 % (ref 0.3–6.2)
ERYTHROCYTE [DISTWIDTH] IN BLOOD BY AUTOMATED COUNT: 11.2 % (ref 12.3–15.4)
ERYTHROCYTE [SEDIMENTATION RATE] IN BLOOD: 8 MM/HR (ref 0–20)
EXPIRATION DATE: NORMAL
GLOBULIN UR ELPH-MCNC: 3.1 GM/DL
GLUCOSE SERPL-MCNC: 68 MG/DL (ref 65–99)
HCT VFR BLD AUTO: 40.1 % (ref 34–46.6)
HGB BLD-MCNC: 13.8 G/DL (ref 12–15.9)
IMM GRANULOCYTES # BLD AUTO: 0.02 10*3/MM3 (ref 0–0.05)
IMM GRANULOCYTES NFR BLD AUTO: 0.2 % (ref 0–0.5)
INTERNAL NEGATIVE CONTROL: NEGATIVE
INTERNAL POSITIVE CONTROL: POSITIVE
LYMPHOCYTES # BLD AUTO: 2.83 10*3/MM3 (ref 0.7–3.1)
LYMPHOCYTES NFR BLD AUTO: 31.6 % (ref 19.6–45.3)
Lab: NORMAL
MCH RBC QN AUTO: 30.8 PG (ref 26.6–33)
MCHC RBC AUTO-ENTMCNC: 34.4 G/DL (ref 31.5–35.7)
MCV RBC AUTO: 89.5 FL (ref 79–97)
MONOCYTES # BLD AUTO: 0.55 10*3/MM3 (ref 0.1–0.9)
MONOCYTES NFR BLD AUTO: 6.1 % (ref 5–12)
NEUTROPHILS NFR BLD AUTO: 5.48 10*3/MM3 (ref 1.7–7)
NEUTROPHILS NFR BLD AUTO: 61.2 % (ref 42.7–76)
NRBC BLD AUTO-RTO: 0 /100 WBC (ref 0–0.2)
PLATELET # BLD AUTO: 348 10*3/MM3 (ref 140–450)
PMV BLD AUTO: 9.8 FL (ref 6–12)
POTASSIUM SERPL-SCNC: 3.6 MMOL/L (ref 3.5–5.2)
PROT SERPL-MCNC: 7.3 G/DL (ref 6–8.5)
RBC # BLD AUTO: 4.48 10*6/MM3 (ref 3.77–5.28)
SODIUM SERPL-SCNC: 139 MMOL/L (ref 136–145)
TSH SERPL DL<=0.05 MIU/L-ACNC: 1.14 UIU/ML (ref 0.27–4.2)
WBC NRBC COR # BLD: 8.96 10*3/MM3 (ref 3.4–10.8)

## 2023-03-21 PROCEDURE — 45380 COLONOSCOPY AND BIOPSY: CPT | Performed by: INTERNAL MEDICINE

## 2023-03-21 PROCEDURE — 25010000002 PROPOFOL 10 MG/ML EMULSION: Performed by: NURSE ANESTHETIST, CERTIFIED REGISTERED

## 2023-03-21 PROCEDURE — 86140 C-REACTIVE PROTEIN: CPT | Performed by: INTERNAL MEDICINE

## 2023-03-21 PROCEDURE — 84443 ASSAY THYROID STIM HORMONE: CPT | Performed by: INTERNAL MEDICINE

## 2023-03-21 PROCEDURE — 85025 COMPLETE CBC W/AUTO DIFF WBC: CPT | Performed by: INTERNAL MEDICINE

## 2023-03-21 PROCEDURE — 85652 RBC SED RATE AUTOMATED: CPT | Performed by: INTERNAL MEDICINE

## 2023-03-21 PROCEDURE — 81025 URINE PREGNANCY TEST: CPT | Performed by: INTERNAL MEDICINE

## 2023-03-21 PROCEDURE — 80053 COMPREHEN METABOLIC PANEL: CPT | Performed by: INTERNAL MEDICINE

## 2023-03-21 PROCEDURE — 88305 TISSUE EXAM BY PATHOLOGIST: CPT | Performed by: INTERNAL MEDICINE

## 2023-03-21 RX ORDER — SODIUM CHLORIDE, SODIUM LACTATE, POTASSIUM CHLORIDE, CALCIUM CHLORIDE 600; 310; 30; 20 MG/100ML; MG/100ML; MG/100ML; MG/100ML
30 INJECTION, SOLUTION INTRAVENOUS CONTINUOUS PRN
Status: DISCONTINUED | OUTPATIENT
Start: 2023-03-21 | End: 2023-03-21 | Stop reason: HOSPADM

## 2023-03-21 RX ORDER — PROPOFOL 10 MG/ML
VIAL (ML) INTRAVENOUS CONTINUOUS PRN
Status: DISCONTINUED | OUTPATIENT
Start: 2023-03-21 | End: 2023-03-21 | Stop reason: SURG

## 2023-03-21 RX ORDER — PROPOFOL 10 MG/ML
VIAL (ML) INTRAVENOUS AS NEEDED
Status: DISCONTINUED | OUTPATIENT
Start: 2023-03-21 | End: 2023-03-21 | Stop reason: SURG

## 2023-03-21 RX ORDER — LIDOCAINE HYDROCHLORIDE 20 MG/ML
INJECTION, SOLUTION INFILTRATION; PERINEURAL AS NEEDED
Status: DISCONTINUED | OUTPATIENT
Start: 2023-03-21 | End: 2023-03-21 | Stop reason: SURG

## 2023-03-21 RX ADMIN — PROPOFOL 180 MCG/KG/MIN: 10 INJECTION, EMULSION INTRAVENOUS at 13:51

## 2023-03-21 RX ADMIN — SODIUM CHLORIDE, POTASSIUM CHLORIDE, SODIUM LACTATE AND CALCIUM CHLORIDE 30 ML/HR: 600; 310; 30; 20 INJECTION, SOLUTION INTRAVENOUS at 13:23

## 2023-03-21 RX ADMIN — Medication 50 MG: at 13:52

## 2023-03-21 RX ADMIN — LIDOCAINE HYDROCHLORIDE 60 MG: 20 INJECTION, SOLUTION INFILTRATION; PERINEURAL at 13:51

## 2023-03-21 NOTE — ANESTHESIA PREPROCEDURE EVALUATION
Anesthesia Evaluation     Patient summary reviewed and Nursing notes reviewed   NPO Solid Status: > 8 hours  NPO Liquid Status: > 4 hours           Airway   Mallampati: II  TM distance: >3 FB  Neck ROM: full  No difficulty expected  Dental - normal exam     Pulmonary - normal exam   (+) a smoker Former,   Cardiovascular - normal exam        Neuro/Psych  (+) psychiatric history ADHD,    GI/Hepatic/Renal/Endo      ROS Comment: Hx of UC    Musculoskeletal     Abdominal  - normal exam   Substance History      OB/GYN          Other                          Anesthesia Plan    ASA 2     MAC     (Pt would prefer as little as possible medicine)  intravenous induction     Anesthetic plan, risks, benefits, and alternatives have been provided, discussed and informed consent has been obtained with: patient.

## 2023-03-21 NOTE — H&P
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  Her last colonoscopy was on 3/15/2021.  Her last EGD was in .  She does have a history of colon polyps.  She tells me she has been doing really well on Lialda 4/day (she may try to decrease to 2-3 pills/day) for her UC.  She feels the Canasa Suppositories q HS help.        She is not doing well. She feels she if flairing. No rectal bleeding or melena. Some mucous and more requent BM's. She has 3-10 BM/day (she had 3 BM yesterday). No abdominal pain or chest pain. NO fevers, chills. She has gained weight.         We treated her for h. Pylori in the past. She wants to get another breath test.      Medical History        Past Medical History:   Diagnosis Date   • Abnormal Pap smear of cervix ,    • Acne     • ADHD     • Ulcerative colitis (HCC)              Surgical History         Past Surgical History:   Procedure Laterality Date   • AUGMENTATION MAMMAPLASTY       • BREAST AUGMENTATION       •  SECTION       • COLONOSCOPY   2015     left sided ulcerative colitis   • COLONOSCOPY   2011     Probable ulcerative proctitis? with some inflammation around the appendiceal orifice   • COLONOSCOPY N/A 10/22/2018     Normal   • COLONOSCOPY N/A 3/15/2021     Procedure: COLONOSCOPY TO CECUM WITH BIOPSIES;  Surgeon: David Capellan MD;  Location: [facility] ENDOSCOPY;  Service: Gastroenterology;  Laterality: N/A;  PRE- HISTORY ULCERATIVE COLITIS  POST- NORMAL   • UPPER GASTROINTESTINAL ENDOSCOPY   2015     gastritis   • WISDOM TOOTH EXTRACTION                   Current Outpatient Medications:   •  amphetamine-dextroamphetamine XR (ADDERALL XR) 10 MG 24 hr capsule, , Disp: , Rfl:   •  BIOTIN PO, Take  by mouth., Disp: , Rfl:   •  cetirizine (zyrTEC) 10 MG tablet, TAKE 1 TABLET BY MOUTH EVERY DAY AS NEEDED FOR  ALLERGIES OR RUNNY NOSE, Disp: , Rfl:   •  fluticasone (FLONASE) 50 MCG/ACT nasal spray, 1 spray by Each Nare route Daily. Shake before using., Disp: , Rfl:   •  mesalamine (Lialda) 1.2 g EC tablet, TAKE 4 TABLETS BY MOUTH DAILY, Disp: 360 tablet, Rfl: 3  •  montelukast (SINGULAIR) 10 MG tablet, TAKE 1 TABLET BY MOUTH EVERY NIGHT, Disp: , Rfl:   •  NON FORMULARY, [facility], Disp: , Rfl:   •  Norethin-Eth Estrad-Fe Biphas (Lo Loestrin Fe) 1 MG-10 MCG / 10 MCG tablet, Take 1 tablet by mouth Daily., Disp: 84 tablet, Rfl: 2  •  spironolactone (ALDACTONE) 25 MG tablet, TK 1 TO 2 TS PO D, Disp: , Rfl: 0  •  tretinoin (RETIN-A) 0.05 % cream, , Disp: , Rfl:   •  VITAMIN D PO, Take  by mouth Daily., Disp: , Rfl:   •  fluconazole (DIFLUCAN) 150 MG tablet, TAKE 1 TABLET BY MOUTH 1 TIME FOR 1 DOSE, Disp: 1 tablet, Rfl: 0  •  mesalamine (CANASA) 1000 MG suppository, Insert 1 suppository into the rectum Every Night for 42 days., Disp: 30 suppository, Rfl: 11     No Known Allergies           Family History   Problem Relation Age of Onset   • Pancreatic cancer Maternal Grandmother     • Melanoma Sister     • Prostate cancer Maternal Uncle     • Melanoma Maternal Grandfather     • Pulmonary embolism Father     • Breast cancer Neg Hx     • Ovarian cancer Neg Hx     • Uterine cancer Neg Hx     • Colon cancer Neg Hx     • Deep vein thrombosis Neg Hx           Social History   Social History            Socioeconomic History   • Marital status:    Tobacco Use   • Smoking status: Former       Types: Cigarettes       Quit date:        Years since quittin.0   • Smokeless tobacco: Never   • Tobacco comments:       smoked some in twenties.   Vaping Use   • Vaping Use: Never used   Substance and Sexual Activity   • Alcohol use: Yes       Comment: rare/socail    • Drug use: No   • Sexual activity: Not Currently       Birth control/protection: OCP            Review of Systems   Gastrointestinal: Positive for diarrhea.   All  other systems reviewed and are negative.     Pertinent positives and negatives documented in the HPI and all other systems reviewed and were found to be negative.      Vitals:     01/05/23 1135   BP: 117/85   Pulse: 84   Temp: 96.9 °F (36.1 °C)         Physical Exam  Vitals reviewed.   Constitutional:       General: She is not in acute distress.     Appearance: Normal appearance. She is well-developed. She is not diaphoretic.   HENT:      Head: Normocephalic and atraumatic. Hair is normal.      Right Ear: Hearing, tympanic membrane, ear canal and external ear normal. No decreased hearing noted. No drainage.      Left Ear: Hearing, tympanic membrane, ear canal and external ear normal. No decreased hearing noted.      Nose: Nose normal. No nasal deformity.      Mouth/Throat:      Mouth: Mucous membranes are moist.   Eyes:      General: Lids are normal.         Right eye: No discharge.         Left eye: No discharge.      Extraocular Movements: Extraocular movements intact.      Conjunctiva/sclera: Conjunctivae normal.      Pupils: Pupils are equal, round, and reactive to light.   Neck:      Thyroid: No thyromegaly.      Vascular: No JVD.      Trachea: No tracheal deviation.   Cardiovascular:      Rate and Rhythm: Normal rate and regular rhythm.      Pulses: Normal pulses.      Heart sounds: Normal heart sounds. No murmur heard.    No friction rub. No gallop.   Pulmonary:      Effort: Pulmonary effort is normal. No respiratory distress.      Breath sounds: Normal breath sounds. No wheezing or rales.   Chest:      Chest wall: No tenderness.   Abdominal:      General: Bowel sounds are normal. There is no distension.      Palpations: Abdomen is soft. There is no mass.      Tenderness: There is no abdominal tenderness. There is no guarding or rebound.      Hernia: No hernia is present.   Musculoskeletal:         General: No tenderness or deformity. Normal range of motion.      Cervical back: Normal range of motion and neck  supple.   Lymphadenopathy:      Cervical: No cervical adenopathy.   Skin:     General: Skin is warm and dry.      Findings: No erythema or rash.   Neurological:      Mental Status: She is alert and oriented to person, place, and time.      Cranial Nerves: No cranial nerve deficit.      Motor: No abnormal muscle tone.      Coordination: Coordination normal.      Deep Tendon Reflexes: Reflexes are normal and symmetric. Reflexes normal.   Psychiatric:         Mood and Affect: Mood normal.         Behavior: Behavior normal.         Thought Content: Thought content normal.         Judgment: Judgment normal.            Diagnoses and all orders for this visit:     1. Ulcerative pancolitis without complication (HCC) (Primary)  -     mesalamine (CANASA) 1000 MG suppository; Insert 1 suppository into the rectum Every Night for 42 days.  Dispense: 30 suppository; Refill: 11  -     Case Request; Standing  -     Case Request     2. Helicobacter pylori gastritis  -     H. Pylori Breath Test - Breath, Lung     3. Polyp of colon, unspecified part of colon, unspecified type  -     Case Request; Standing  -     Case Request     4. FH: colon polyps  -     Case Request; Standing  -     Case Request     Other orders  -     Follow Anesthesia Guidelines / Protocol; Future  -     Obtain Informed Consent; Future  -     Implement Anesthesia orders day of procedure.; Standing  -     Obtain informed consent; Standing  -     Verify bowel prep was successful; Standing  -     Give tap water enema if bowel prep was insufficient; Standing        Assessment:  1. ulcerative pancolitis in 2009  2.  H/o helicobacter pylori gastritis  3.      Recommendations:  1. Colonoscopy  2. H. pylori breath test today.  3.      No follow-ups on file.     3/21/23 - No change from the above H and P.   David Capellan MD

## 2023-03-21 NOTE — ANESTHESIA POSTPROCEDURE EVALUATION
Patient: Marah Britt    Procedure Summary     Date: 03/21/23 Room / Location:  [facility]    Anesthesia Start: 1345 Anesthesia Stop: 1416    Procedure: COLONOSCOPY to cecum into TI with cold biopsies/polypectomies Diagnosis:       Ulcerative pancolitis without complication (HCC)      Polyp of colon, unspecified part of colon, unspecified type      FH: colon polyps      (Ulcerative pancolitis without complication (HCC) [K51.00])      (Polyp of colon, unspecified part of colon, unspecified type [K63.5])      (FH: colon polyps [Z83.71])    Surgeons: David Capellan MD Provider: Juan Carlos Dalton MD    Anesthesia Type: MAC ASA Status: 2          Anesthesia Type: MAC    Vitals  Vitals Value Taken Time   /64 03/21/23 1428   Temp     Pulse 84 03/21/23 1428   Resp 16 03/21/23 1428   SpO2 100 % 03/21/23 1428           Post Anesthesia Care and Evaluation    Patient location during evaluation: PHASE II  Patient participation: complete - patient participated  Level of consciousness: awake and alert  Pain management: adequate    Airway patency: patent  Anesthetic complications: No anesthetic complications  PONV Status: none  Cardiovascular status: acceptable and hemodynamically stable  Respiratory status: acceptable, nonlabored ventilation and spontaneous ventilation  Hydration status: acceptable

## 2023-03-22 LAB
LAB AP CASE REPORT: NORMAL
PATH REPORT.FINAL DX SPEC: NORMAL
PATH REPORT.GROSS SPEC: NORMAL

## 2023-03-23 RX ORDER — MESALAMINE 1.2 G/1
TABLET, DELAYED RELEASE ORAL
Qty: 360 TABLET | Refills: 3 | Status: SHIPPED | OUTPATIENT
Start: 2023-03-23

## 2023-03-27 ENCOUNTER — TELEPHONE (OUTPATIENT)
Dept: GASTROENTEROLOGY | Facility: CLINIC | Age: 48
End: 2023-03-27
Payer: MEDICAID

## 2023-03-27 RX ORDER — MESALAMINE 1000 MG/1
1000 SUPPOSITORY RECTAL NIGHTLY
Qty: 30 SUPPOSITORY | Refills: 3 | Status: SHIPPED | OUTPATIENT
Start: 2023-03-27 | End: 2023-05-08

## 2023-03-27 NOTE — TELEPHONE ENCOUNTER
----- Message from David Capellan MD sent at 3/26/2023 10:00 PM EDT -----  03/26/23       Tell her that her labs looked good.        The colon polyps that were removed were not cancerous and not precancerous. The majority of the colon biopsies looked normal.  Biopsies from the rectum, colon at 20 cms (from the anus) and 30 cms (from the anus) showed mild inflammation. I recommend a repeat colonoscopy in 2 yrs.       I would recommend that she continue the Lialda 4 pills/day. I would also like to try having her take Canasa suppositories 1000 mg one rectally q HS (in addition to the Lialda). If she is OK with this then please send in a prescription to her pharmacy. Have her f/u with me in the office in 6 prachi weeks.        Send a copy to her PCP.  Fernandez agosto

## 2023-03-27 NOTE — PROGRESS NOTES
03/26/23       Tell her that her labs looked good.        The colon polyps that were removed were not cancerous and not precancerous. The majority of the colon biopsies looked normal.  Biopsies from the rectum, colon at 20 cms (from the anus) and 30 cms (from the anus) showed mild inflammation. I recommend a repeat colonoscopy in 2 yrs.       I would recommend that she continue the Lialda 4 pills/day. I would also like to try having her take Canasa suppositories 1000 mg one rectally q HS (in addition to the Lialda). If she is OK with this then please send in a prescription to her pharmacy. Have her f/u with me in the office in 6 prachi weeks.        Send a copy to her PCP.  Fernandez agosto

## 2023-03-27 NOTE — TELEPHONE ENCOUNTER
I don't know how long she would be on it. We have to see if it works first and can she tolerate it? I would recommend that she try to not miss any doses if possible. Thx.[facility]     Called pt and left  for pt to call back.

## 2023-03-27 NOTE — TELEPHONE ENCOUNTER
Patient notified of results and recommendations and verbalized understanding    Hm and cs recall placed for 3/21/25    Pt would like to try the Canasa supp. I have sent to the pharmacy.  She is   asking how long she will need to stay on this.  She is asking if this has to be used daily? If she can miss any doses of this?      Result note routed to PCP via epic.

## 2023-05-16 NOTE — ED PROVIDER NOTES
ICU Transfer Note    Transfer from: ICU  Transfer to:  (  ) Medicine    (  ) Telemetry    (  ) RCU    (  ) Palliative    (  ) Stroke Unit    (  x) ___stepdown____________  Accepting physican:      H&P:  75yo M hx of afib on eliquis, HFrEF (unknown EF), CAD s/p CABG (8 years ago), HTN, asthma presented to the hospital for syncopal episode after feeling dizzy yesterday evening.  Patient speaks a dialect similar to Cantonese but is a village dialect. Patient is able to understand Cantonese, but 100%  will not be able to understand the patient's language. I understood 100% of patient's conversation.   Patient was feeling dizzy while walking to the bathroom at 10PM  followed by a fall. +LOC, + AC. Unsure if he had head trauma, but does not experience any headaches. Unwitnessed by family. Denies fevers, shortness of breath, chest pain, abdominal pain, diarrhea, constipation, dysuria.   Upon arrival to the ED, patient was complaining of having productive sputum needing to cough, epistaxis, and followed by ~50cc of hematemesis. Brought into hospital via EMS  At the ED, trauma alert was activated due to the fall. Dried blood found in the nares. VS was T-97.8F, BP: 97/54, HR: 71bpm, RR: 17bpm, SpO2: 96% on RA. After episode of hemoptysis, patient's SBP dropped from 89-->85. 2u prbc were ordered resulting in improvement of BP. Labs significant for microcytic anemia (Hb- 7.3), BUN- 33. troponin<0.01 x1.   Trauma workup imaging:   CT Head noncon- no acute fracture or hemorrhage  CT spine: no acute fracture or subluxation  CTA Chest Aorta w/wo IV contrast: very short segment acute type B aortic dissection originating from left subclavian artery takeoff of the aortic arch extending few cm from aortic arch, no evidence of acute traumatic injury, arterial extravasation.  CTA Abd+Pelvis: no acute pathology  Xray Pelvis: no acute fracture  Patient admitted to ICU for Upper GI bleed. Currently, VS stable: with SBP 110s. Given 2u prbc. started octrotide and protonix infusion. s/p ceftriaxone and erythromycin IV. given K centra 5g. Vascular surgery consulted for aortic dissection. No acute surgical intervention, Recommend keep -140 SBP.  per ED signout. GI consutled for GI Bleed. Plan for colonoscopy in today.       ICU COURSE: Cardiology, CT surgery, and Vascular Surgery evaluated patient for aortic dissection, stated no acute surgical intervention, and consult GI for GIB. Pt received 2u RBC in ED. No further active bleeding since ED presentation. GI following, initially plan was for EGD, but given high cardiac risk, will not do EGD until patient is more stable given he no active bleeding.  Patient started on esmolol drip to keep HR <70 and SBP <140 for aortic dissection (read as acute on CTA chest, but may be chronic). Started lopressor 25mg q8h, restarted home entresto, and started lasix 40mg qd (on 20mg qd at home). Since 5/16 early afternoon pt's HR and BP have been controlled off esmolol drip. Repeat CBC stable. Downgrade to stepdown approved by Dr. Florentino Mathews.     ASSESSMENT & PLAN:   75yo M hx of afib on eliquis, HFrEF (unknown EF), CAD s/p CABG (8 years ago), HTN, asthma presented to the hospital for syncopal episode followed by unwitnessed fall. At the ED, had 50cc hematemesis from suspected upper GIB. Admitted to ICU    #Suspected Upper GI Bleed  #Hematemesis   #Acute blood loss anemia - resolved s/p 2u RBC  - pt presented with hematemesis, epistaxis, and fall associated with hypotension  - Hgb 7.3 received 2u RBC, repeat Hgb stable  - no further active bleeding since ED presentation  - GI following -- no EGD until more stable given no active bleed   - IV PPI BID  - CBC q12h, transfuse for Hgb < 7.0  - ferrous sulfate 325mg qd     #Unwitnessed fall  - trauma workup CTH, CT spine, CT A/P, XR pelvis negative   - 5/12 echo: EF 68%, mild-mod AR, mild MR, mild TR, mild pHTN  - fall precautions    #Acute type B aortic dissection (very short segment)  - CTA chest aorta w/wo IV contrast: very short segment acute type B aortic dissection originating from left subclavian artery takeoff of the aortic arch extending few cm from aortic arch, no evidence of acute traumatic injury, arterial extravasation  - trop negative   - Vascular surgery consulted: strict BP control, no acute surgery, hold all AC for GIB  - CT Surgery consulted, rec BP control, fluid resuscitation for hypovolemia; keep HR < 80  - Cardio consulted: start esmolol drip goal HR 50s, add nicardipine if remains hypertensive, hold AC  - keep SBP < 120, HR < 60  - wean esmolol drip  - started lopressor 25mg q8h     #H/o HFrEF (unknown EF) with now recovered EF  #CAD s/p CABG   - at home is on lasix 20mg qd entresto 24-26mg qd, eplerenone 25mg qd  - restart entresto  - increased lasix to 40mg qd   - no fluids  - repeat 5/12 echo: EF 68%, mild-mod AR, mild MR, mild TR, mild pHTN  - statin    #Paroxysmal afib on eliquis  - not on any rate control at home  - start lopressor 50mg q8h  - esmolol drip to keep HR < 60 for aortic dissection -- wean  - hold AC for GIB  - duplex negative for DVT    #Hx of asthma  - not in exacerbation  - SpO2 98% on RA, no wheezing   - c/w home montelukast 10mg qd     Misc  - DVT ppx: SCDs  - GI ppx: IV PPI BID  - Diet:  low fiber soft bite sized   - Activity: IAT  - Code status: FULL  - Dispo: repeat Hgb stable and off esmolol drip --> downgrade to SDU        For Follow-Up:  [ ] AM CBC  [ ] HR and BP control off esmolol drip  [ ] GI for final plan       Vital Signs Last 24 Hrs  T(C): 36.4 (16 May 2023 16:00), Max: 36.7 (15 May 2023 20:00)  T(F): 97.6 (16 May 2023 16:00), Max: 98 (15 May 2023 20:00)  HR: 64 (16 May 2023 17:00) (57 - 78)  BP: 152/64 (16 May 2023 13:00) (101/55 - 152/64)  BP(mean): 92 (16 May 2023 13:00) (74 - 101)  RR: 28 (16 May 2023 17:00) (21 - 36)  SpO2: 96% (16 May 2023 17:00) (94% - 100%)    Parameters below as of 16 May 2023 16:00  Patient On (Oxygen Delivery Method): nasal cannula  O2 Flow (L/min): 2    I&O's Summary    15 May 2023 07:01  -  16 May 2023 07:00  --------------------------------------------------------  IN: 1922.3 mL / OUT: 2050 mL / NET: -127.7 mL    16 May 2023 07:01  -  16 May 2023 18:03  --------------------------------------------------------  IN: 151.6 mL / OUT: 1100 mL / NET: -948.4 mL          MEDICATIONS  (STANDING):  atorvastatin 20 milliGRAM(s) Oral at bedtime  chlorhexidine 2% Cloths 1 Application(s) Topical daily  esmolol  Infusion 49.951 MICROgram(s)/kG/Min (30.3 mL/Hr) IV Continuous <Continuous>  ferrous    sulfate 325 milliGRAM(s) Oral daily  furosemide    Tablet 40 milliGRAM(s) Oral daily  metoprolol tartrate 50 milliGRAM(s) Oral every 8 hours  montelukast 10 milliGRAM(s) Oral daily  pantoprazole  Injectable 40 milliGRAM(s) IV Push every 12 hours  sacubitril 24 mG/valsartan 26 mG 1 Tablet(s) Oral two times a day  tamsulosin 0.4 milliGRAM(s) Oral at bedtime    MEDICATIONS  (PRN):  albuterol/ipratropium for Nebulization 3 milliLiter(s) Nebulizer every 6 hours PRN Shortness of Breath and/or Wheezing        LABS                                            8.7                   Neurophils% (auto):   x      (05-16 @ 14:00):    7.78 )-----------(227          Lymphocytes% (auto):  x                                             28.5                   Eosinphils% (auto):   x        Manual%: Neutrophils x    ; Lymphocytes x    ; Eosinophils x    ; Bands%: x    ; Blasts x                                    137    |  106    |  15                  Calcium: 8.0   / iCa: x      (05-16 @ 05:15)    ----------------------------<  76        Magnesium: 2.0                              4.0     |  18     |  0.9              Phosphorous: x        TPro  5.5    /  Alb  3.5    /  TBili  0.8    /  DBili  x      /  AST  15     /  ALT  9      /  AlkPhos  56     16 May 2023 05:15 MD ATTESTATION NOTE    The MORGAN and I have discussed this patient's history, physical exam, and treatment plan.  I have reviewed the documentation and personally had a face to face interaction with the patient. I affirm the documentation and agree with the treatment and plan.  The attached note describes my personal findings.      Marah Britt is a 46 y.o. female who presents to the ED c/o palpitations.  States that she received her Covid back seen booster dose on 12/28 -states that she has been feeling unwell run up to it and then states that on 1/1/2022 she tested positive for Covid.  Patient reports that she has been having myalgias      On exam:  GENERAL: not distressed  HENT: nares patent  EYES: no scleral icterus  CV: regular rhythm, regular rate  RESPIRATORY: normal effort, clear auscultation bilaterally  ABDOMEN: soft nontender nondistended  MUSCULOSKELETAL: no deformity  NEURO: alert, moves all extremities, follows commands  SKIN: warm, dry    Labs  Recent Results (from the past 24 hour(s))   ECG 12 Lead    Collection Time: 01/04/22  9:01 PM   Result Value Ref Range    QT Interval 353 ms   Comprehensive Metabolic Panel    Collection Time: 01/05/22  3:54 AM    Specimen: Blood   Result Value Ref Range    Glucose 104 (H) 65 - 99 mg/dL    BUN 10 6 - 20 mg/dL    Creatinine 0.68 0.57 - 1.00 mg/dL    Sodium 140 136 - 145 mmol/L    Potassium 3.7 3.5 - 5.2 mmol/L    Chloride 107 98 - 107 mmol/L    CO2 22.0 22.0 - 29.0 mmol/L    Calcium 9.1 8.6 - 10.5 mg/dL    Total Protein 7.3 6.0 - 8.5 g/dL    Albumin 4.20 3.50 - 5.20 g/dL    ALT (SGPT) 18 1 - 33 U/L    AST (SGOT) 16 1 - 32 U/L    Alkaline Phosphatase 59 39 - 117 U/L    Total Bilirubin 0.5 0.0 - 1.2 mg/dL    eGFR Non African Amer 93 >60 mL/min/1.73    Globulin 3.1 gm/dL    A/G Ratio 1.4 g/dL    BUN/Creatinine Ratio 14.7 7.0 - 25.0    Anion Gap 11.0 5.0 - 15.0 mmol/L   hCG, Serum, Qualitative    Collection Time: 01/05/22  3:54 AM    Specimen: Blood   Result Value  Ref Range    HCG Qualitative Negative Negative   BNP    Collection Time: 01/05/22  3:54 AM    Specimen: Blood   Result Value Ref Range    proBNP 36.8 0.0 - 450.0 pg/mL   Troponin    Collection Time: 01/05/22  3:54 AM    Specimen: Blood   Result Value Ref Range    Troponin T <0.010 0.000 - 0.030 ng/mL   Protime-INR    Collection Time: 01/05/22  3:54 AM    Specimen: Blood   Result Value Ref Range    Protime 13.2 11.7 - 14.2 Seconds    INR 1.02 0.90 - 1.10   aPTT    Collection Time: 01/05/22  3:54 AM    Specimen: Blood   Result Value Ref Range    PTT 27.7 22.7 - 35.4 seconds   CBC Auto Differential    Collection Time: 01/05/22  3:54 AM    Specimen: Blood   Result Value Ref Range    WBC 8.67 3.40 - 10.80 10*3/mm3    RBC 4.46 3.77 - 5.28 10*6/mm3    Hemoglobin 13.5 12.0 - 15.9 g/dL    Hematocrit 41.1 34.0 - 46.6 %    MCV 92.2 79.0 - 97.0 fL    MCH 30.3 26.6 - 33.0 pg    MCHC 32.8 31.5 - 35.7 g/dL    RDW 11.6 (L) 12.3 - 15.4 %    RDW-SD 39.0 37.0 - 54.0 fl    MPV 9.2 6.0 - 12.0 fL    Platelets 324 140 - 450 10*3/mm3    Neutrophil % 46.8 42.7 - 76.0 %    Lymphocyte % 44.5 19.6 - 45.3 %    Monocyte % 7.6 5.0 - 12.0 %    Eosinophil % 0.5 0.3 - 6.2 %    Basophil % 0.3 0.0 - 1.5 %    Immature Grans % 0.3 0.0 - 0.5 %    Neutrophils, Absolute 4.05 1.70 - 7.00 10*3/mm3    Lymphocytes, Absolute 3.86 (H) 0.70 - 3.10 10*3/mm3    Monocytes, Absolute 0.66 0.10 - 0.90 10*3/mm3    Eosinophils, Absolute 0.04 0.00 - 0.40 10*3/mm3    Basophils, Absolute 0.03 0.00 - 0.20 10*3/mm3    Immature Grans, Absolute 0.03 0.00 - 0.05 10*3/mm3    nRBC 0.0 0.0 - 0.2 /100 WBC       Radiology  XR Chest 1 View    Result Date: 1/5/2022  Patient: TOO PHILLIPS  Time Out: 00:11 Exam(s): FILM CXR 1 VIEW EXAM:   XR Chest, 1 View CLINICAL HISTORY:    Reason for exam: soa. TECHNIQUE:   Frontal view of the chest. COMPARISON:   No relevant prior studies available. FINDINGS:   Lungs:  Unremarkable.   Pleural space:  Unremarkable.   Heart:  Unremarkable.    Mediastinum:  Unremarkable.   Bones joints:  Unremarkable. IMPRESSION:       Normal chest x-ray.     Electronically signed by Cecil Pro MD on 01-05-22 at 0011       Medical Decision Making:  ED Course as of 01/05/22 0532   Wed Jan 05, 2022   0423 EKG          EKG time: 21:01  Rhythm/Rate: Sinus rhythm at 87 bpm  P waves and NH: Probable left atrial enlargement  QRS, axis: Normal  ST and T waves: No acute ST/T wave changes    Interpreted Contemporaneously by me, independently viewed  No prior EKG for comparison.   [KRYSTA]   0425 WBC: 8.67 [KRYSTA]   0500 Patient requesting antibiotic infusion therapy for her Covid infection, will order this. [KRYSTA]   0513 Hemoglobin: 13.5 [KRYSTA]   0513 Hematocrit: 41.1 [KRYSTA]   0513 Platelets: 324 [KRYSTA]   0513 Glucose(!): 104 [KRYSTA]   0513 BUN: 10 [KRYSTA]   0513 Creatinine: 0.68 [KRYSTA]   0513 Sodium: 140 [KRYSTA]   0513 Potassium: 3.7 [KRYSTA]   0513 HCG Qualitative: Negative [KRYSTA]   0513 proBNP: 36.8 [KRYSTA]   0513 Troponin T: <0.010 [KRYSTA]   0514 Patient rechecked, no acute distress, discussed plan for outpatient antibiotic use and therapy and her normal cardiac work-up.  Patient expressed understanding and agrees with plan. [KRYSTA]      ED Course User Index  [KRYSTA] Alin Hinds PA           PPE: Both the patient and I wore a surgical mask throughout the entire patient encounter. I wore protective goggles.     Diagnosis  Final diagnoses:   Palpitations   COVID-19 virus infection   History of ulcerative colitis        Prince Frederick MD  01/05/22 0532     ICU Transfer Note    Transfer from: ICU  Transfer to:  (  ) Medicine    (  ) Telemetry    (  ) RCU    (  ) Palliative    (  ) Stroke Unit    (  x) ___stepdown____________  Accepting physican:      H&P:  77yo M hx of afib on eliquis, HFrEF (unknown EF), CAD s/p CABG (8 years ago), HTN, asthma presented to the hospital for syncopal episode after feeling dizzy yesterday evening.  Patient speaks a dialect similar to Cantonese but is a village dialect. Patient is able to understand Cantonese, but 100%  will not be able to understand the patient's language. I understood 100% of patient's conversation.   Patient was feeling dizzy while walking to the bathroom at 10PM  followed by a fall. +LOC, + AC. Unsure if he had head trauma, but does not experience any headaches. Unwitnessed by family. Denies fevers, shortness of breath, chest pain, abdominal pain, diarrhea, constipation, dysuria.   Upon arrival to the ED, patient was complaining of having productive sputum needing to cough, epistaxis, and followed by ~50cc of hematemesis. Brought into hospital via EMS  At the ED, trauma alert was activated due to the fall. Dried blood found in the nares. VS was T-97.8F, BP: 97/54, HR: 71bpm, RR: 17bpm, SpO2: 96% on RA. After episode of hemoptysis, patient's SBP dropped from 89-->85. 2u prbc were ordered resulting in improvement of BP. Labs significant for microcytic anemia (Hb- 7.3), BUN- 33. troponin<0.01 x1.   Trauma workup imaging:   CT Head noncon- no acute fracture or hemorrhage  CT spine: no acute fracture or subluxation  CTA Chest Aorta w/wo IV contrast: very short segment acute type B aortic dissection originating from left subclavian artery takeoff of the aortic arch extending few cm from aortic arch, no evidence of acute traumatic injury, arterial extravasation.  CTA Abd+Pelvis: no acute pathology  Xray Pelvis: no acute fracture  Patient admitted to ICU for Upper GI bleed. Currently, VS stable: with SBP 110s. Given 2u prbc. started octrotide and protonix infusion. s/p ceftriaxone and erythromycin IV. given K centra 5g. Vascular surgery consulted for aortic dissection. No acute surgical intervention, Recommend keep -140 SBP.  per ED signout. GI consutled for GI Bleed. Plan for colonoscopy in today.       ICU COURSE: Cardiology, CT surgery, and Vascular Surgery evaluated patient for aortic dissection, stated no acute surgical intervention, and consult GI for GIB. Pt received 2u RBC in ED. No further active bleeding since ED presentation. GI following, initially plan was for EGD, but given high cardiac risk, will not do EGD until patient is more stable given no active bleeding.  Patient started on esmolol drip to keep HR <70 and SBP <140 for aortic dissection (read as acute on CTA chest, but may be chronic). Started lopressor 25mg q8h, restarted home entresto, and started lasix 40mg qd (on 20mg qd at home). Since 5/16 early afternoon pt's HR and BP have been controlled off esmolol drip. Repeat CBC stable. Downgrade to stepdown approved by Dr. Florentino Mathews.     ASSESSMENT & PLAN:   77yo M hx of afib on eliquis, HFrEF (unknown EF), CAD s/p CABG (8 years ago), HTN, asthma presented to the hospital for syncopal episode followed by unwitnessed fall. At the ED, had 50cc hematemesis from suspected upper GIB. Admitted to ICU    #Suspected Upper GI Bleed  #Hematemesis   #Acute blood loss anemia - resolved s/p 2u RBC  - pt presented with hematemesis, epistaxis, and fall associated with hypotension  - Hgb 7.3 received 2u RBC, repeat Hgb stable  - no further active bleeding since ED presentation  - GI following -- no EGD until more stable given no active bleed   - IV PPI BID  - CBC q12h, transfuse for Hgb < 7.0  - ferrous sulfate 325mg qd     #Unwitnessed fall  - trauma workup CTH, CT spine, CT A/P, XR pelvis negative   - 5/12 echo: EF 68%, mild-mod AR, mild MR, mild TR, mild pHTN  - fall precautions    #Acute type B aortic dissection (very short segment)  - CTA chest aorta w/wo IV contrast: very short segment acute type B aortic dissection originating from left subclavian artery takeoff of the aortic arch extending few cm from aortic arch, no evidence of acute traumatic injury, arterial extravasation  - trop negative   - Vascular surgery consulted: strict BP control, no acute surgery, hold all AC for GIB  - CT Surgery consulted, rec BP control, fluid resuscitation for hypovolemia; keep HR < 80  - Cardio consulted: start esmolol drip goal HR 50s, add nicardipine if remains hypertensive, hold AC  - keep SBP < 120, HR < 60  - wean esmolol drip  - started lopressor 25mg q8h     #H/o HFrEF (unknown EF) with now recovered EF  #CAD s/p CABG   - at home is on lasix 20mg qd entresto 24-26mg qd, eplerenone 25mg qd  - restart entresto  - increased lasix to 40mg qd   - no fluids  - repeat 5/12 echo: EF 68%, mild-mod AR, mild MR, mild TR, mild pHTN  - statin    #Paroxysmal afib on eliquis  - not on any rate control at home  - start lopressor 50mg q8h  - esmolol drip to keep HR < 60 for aortic dissection -- wean  - hold AC for GIB  - duplex negative for DVT    #Hx of asthma  - not in exacerbation  - SpO2 98% on RA, no wheezing   - c/w home montelukast 10mg qd     Misc  - DVT ppx: SCDs  - GI ppx: IV PPI BID  - Diet:  low fiber soft bite sized   - Activity: IAT  - Code status: FULL  - Dispo: repeat Hgb stable and off esmolol drip --> downgrade to SDU        For Follow-Up:  [ ] AM CBC  [ ] HR and BP control off esmolol drip  [ ] GI for final plan       Vital Signs Last 24 Hrs  T(C): 36.4 (16 May 2023 16:00), Max: 36.7 (15 May 2023 20:00)  T(F): 97.6 (16 May 2023 16:00), Max: 98 (15 May 2023 20:00)  HR: 64 (16 May 2023 17:00) (57 - 78)  BP: 152/64 (16 May 2023 13:00) (101/55 - 152/64)  BP(mean): 92 (16 May 2023 13:00) (74 - 101)  RR: 28 (16 May 2023 17:00) (21 - 36)  SpO2: 96% (16 May 2023 17:00) (94% - 100%)    Parameters below as of 16 May 2023 16:00  Patient On (Oxygen Delivery Method): nasal cannula  O2 Flow (L/min): 2    I&O's Summary    15 May 2023 07:01  -  16 May 2023 07:00  --------------------------------------------------------  IN: 1922.3 mL / OUT: 2050 mL / NET: -127.7 mL    16 May 2023 07:01  -  16 May 2023 18:03  --------------------------------------------------------  IN: 151.6 mL / OUT: 1100 mL / NET: -948.4 mL          MEDICATIONS  (STANDING):  atorvastatin 20 milliGRAM(s) Oral at bedtime  chlorhexidine 2% Cloths 1 Application(s) Topical daily  esmolol  Infusion 49.951 MICROgram(s)/kG/Min (30.3 mL/Hr) IV Continuous <Continuous>  ferrous    sulfate 325 milliGRAM(s) Oral daily  furosemide    Tablet 40 milliGRAM(s) Oral daily  metoprolol tartrate 50 milliGRAM(s) Oral every 8 hours  montelukast 10 milliGRAM(s) Oral daily  pantoprazole  Injectable 40 milliGRAM(s) IV Push every 12 hours  sacubitril 24 mG/valsartan 26 mG 1 Tablet(s) Oral two times a day  tamsulosin 0.4 milliGRAM(s) Oral at bedtime    MEDICATIONS  (PRN):  albuterol/ipratropium for Nebulization 3 milliLiter(s) Nebulizer every 6 hours PRN Shortness of Breath and/or Wheezing        LABS                                            8.7                   Neurophils% (auto):   x      (05-16 @ 14:00):    7.78 )-----------(227          Lymphocytes% (auto):  x                                             28.5                   Eosinphils% (auto):   x        Manual%: Neutrophils x    ; Lymphocytes x    ; Eosinophils x    ; Bands%: x    ; Blasts x                                    137    |  106    |  15                  Calcium: 8.0   / iCa: x      (05-16 @ 05:15)    ----------------------------<  76        Magnesium: 2.0                              4.0     |  18     |  0.9              Phosphorous: x        TPro  5.5    /  Alb  3.5    /  TBili  0.8    /  DBili  x      /  AST  15     /  ALT  9      /  AlkPhos  56     16 May 2023 05:15

## 2023-05-25 NOTE — PROGRESS NOTES
Chief Complaint   Patient presents with   • Gynecologic Exam     AE, Mammo 2022, Last pap 2022 NIL        Marah Britt is a 47 y.o.  who presents for an annual examination.   Reports months of a lesion on her left cheek that is like a scab that concerns her.  She would like a referral to Dermatology    Pap history:  Last pap: 2022 NIL,HPV negative  May 2021 - NIL, HPV negative; 2020 NIL  Prior abnormal paps: yes, had biopsy in 2018  Colonoscopy:  Yes, treated for UC, follows with GI - last 2023  Mammogram: 2022 normal; Dec 2021, due 2023  STDs  Sexually active: yes, has negative testing previously  History of STDs: no  Periods:   On LoLoestrin (reports that it has improved her mental health)   She would like to continue this.    Denies h/o VTE in legs or lungs; non smoker    Is patient's family or personal history significant for any risks for BRCA? no    Past Medical History:   Diagnosis Date   • Abnormal Pap smear of cervix ,    • Acne    • ADHD    • Ulcerative colitis      Past Surgical History:   Procedure Laterality Date   • AUGMENTATION MAMMAPLASTY     • BREAST AUGMENTATION     •  SECTION     • COLONOSCOPY  2015    left sided ulcerative colitis   • COLONOSCOPY  2011    Probable ulcerative proctitis? with some inflammation around the appendiceal orifice   • COLONOSCOPY N/A 10/22/2018    Normal   • COLONOSCOPY N/A 3/15/2021    Procedure: COLONOSCOPY TO CECUM WITH BIOPSIES;  Surgeon: David Capellan MD;  Location: [facility] ENDOSCOPY;  Service: Gastroenterology;  Laterality: N/A;  PRE- HISTORY ULCERATIVE COLITIS  POST- NORMAL   • COLONOSCOPY N/A 3/21/2023    Procedure: COLONOSCOPY to cecum into TI with cold biopsies/polypectomies;  Surgeon: David Capellan MD;  Location: [facility] ENDOSCOPY;  Service: Gastroenterology;  Laterality: N/A;  pre- history ulcerative colitis  post- polyps, proctitis   • UPPER GASTROINTESTINAL ENDOSCOPY  2015     gastritis   • WISDOM TOOTH EXTRACTION       OB History    Para Term  AB Living   1 1 1     1   SAB IAB Ectopic Molar Multiple Live Births             1      # Outcome Date GA Lbr Kvng/2nd Weight Sex Delivery Anes PTL Lv   1 Term      CS-Unspec   RACHID     Social History     Tobacco Use   • Smoking status: Former     Types: Cigarettes     Quit date:      Years since quittin.4   • Smokeless tobacco: Never   • Tobacco comments:     smoked some in twenties.   Vaping Use   • Vaping Use: Never used   Substance Use Topics   • Alcohol use: Yes     Comment: rare/socail    • Drug use: No     Family History   Problem Relation Age of Onset   • Pulmonary embolism Father    • Melanoma Sister    • Prostate cancer Maternal Uncle    • Pancreatic cancer Maternal Grandmother    • Melanoma Maternal Grandfather    • Breast cancer Neg Hx    • Ovarian cancer Neg Hx    • Uterine cancer Neg Hx    • Colon cancer Neg Hx    • Deep vein thrombosis Neg Hx    • Malig Hyperthermia Neg Hx      Current Outpatient Medications on File Prior to Visit   Medication Sig Dispense Refill   • Adderall XR 15 MG 24 hr capsule TAKE ONE CAPSULE BY MOUTH EVERY MORNING . MAXIMUM DAILY AMOUNT IS 15MG     • BIOTIN PO Take 1 tablet by mouth Daily.     • cetirizine (zyrTEC) 10 MG tablet TAKE 1 TABLET BY MOUTH EVERY DAY AS NEEDED FOR ALLERGIES OR RUNNY NOSE     • fluticasone (FLONASE) 50 MCG/ACT nasal spray 1 spray by Each Nare route Daily. Shake before using.     • mesalamine (Lialda) 1.2 g EC tablet TAKE 4 TABLETS BY MOUTH DAILY 360 tablet 3   • montelukast (SINGULAIR) 10 MG tablet Take 1 tablet by mouth Every Morning.     • spironolactone (ALDACTONE) 25 MG tablet TK 1 TO 2 TS PO D  0   • tretinoin (RETIN-A) 0.05 % cream      • VITAMIN D PO Take 25 mg by mouth Daily.     • [DISCONTINUED] Norethin-Eth Estrad-Fe Biphas (Lo Loestrin Fe) 1 MG-10 MCG / 10 MCG tablet Take 1 tablet by mouth Daily. 84 tablet 2   • amphetamine-dextroamphetamine XR  "(ADDERALL XR) 10 MG 24 hr capsule 1 capsule Every Morning (Patient not taking: Reported on 5/26/2023)     • [DISCONTINUED] 5-Hydroxytryptophan (5-HTP PO) Take 1 tablet by mouth Daily.     • [DISCONTINUED] fluconazole (DIFLUCAN) 150 MG tablet TAKE 1 TABLET BY MOUTH 1 TIME FOR 1 DOSE 1 tablet 0   • [DISCONTINUED] NON FORMULARY [facility] wart       No current facility-administered medications on file prior to visit.     No Known Allergies     Review of Systems   Constitutional: Negative.    HENT: Negative.    Respiratory: Negative.    Cardiovascular: Negative.    Gastrointestinal: Negative.    Endocrine: Negative.    Genitourinary: Negative.    Musculoskeletal: Negative.    Skin: Negative.    Neurological: Negative.    Psychiatric/Behavioral: Negative.        OBJECTIVE:   Vitals:    05/26/23 1029   BP: 114/79   Weight: 71.2 kg (157 lb)   Height: 152.4 cm (60\")      Physical Exam  Exam conducted with a chaperone present.   Constitutional:       General: She is not in acute distress.     Appearance: She is well-developed. She is not diaphoretic.   HENT:      Head: Normocephalic and atraumatic.   Neck:      Thyroid: No thyromegaly.      Trachea: No tracheal deviation.   Cardiovascular:      Rate and Rhythm: Normal rate.      Heart sounds: Normal heart sounds. No murmur heard.    No friction rub. No gallop.   Pulmonary:      Effort: Pulmonary effort is normal. No respiratory distress.      Breath sounds: Normal breath sounds.   Chest:      Chest wall: No tenderness.   Breasts:     Right: No inverted nipple, mass, nipple discharge, skin change or tenderness.      Left: No inverted nipple, mass, nipple discharge, skin change or tenderness.   Abdominal:      General: There is no distension.      Palpations: Abdomen is soft. There is no mass.      Tenderness: There is no abdominal tenderness.   Genitourinary:     General: Normal vulva.      Labia:         Right: No rash, lesion or injury.         Left: No rash, lesion or " injury.       Vagina: No vaginal discharge, tenderness or bleeding.      Cervix: No cervical motion tenderness, discharge or friability.      Uterus: Not deviated, not enlarged, not fixed and not tender.       Adnexa:         Right: No mass, tenderness or fullness.          Left: No mass, tenderness or fullness.     Musculoskeletal:         General: No deformity. Normal range of motion.   Lymphadenopathy:      Cervical: No cervical adenopathy.   Skin:     General: Skin is warm and dry.      Findings: Lesion (small (<0.5cm) linear lesion that is red and appears slighly ulcerated on left cheek) present. No rash.   Neurological:      Mental Status: She is alert and oriented to person, place, and time.   Psychiatric:         Behavior: Behavior normal.         Thought Content: Thought content normal.         Judgment: Judgment normal.         ASSESSMENT/PLAN:  Annual well woman visit:  Cervical cancer screening:    Reports  h/o low grade cervical dysplasia   The patient would like to repeat pap smear today     Screening guidelines discussed with patient  Breast cancer screening:    Mammogram due in June 2023   Breast self awareness encouraged  Colonscopy:   Follows with GI due to h/o UC - March 2023  Family history    does not demonstrate need for genetics referral   Healthy lifestyle counseling:   return for routine annual checkups   Reviewed limitations of HSV screening     Reviewed risks/benefits of oral contraceptive pill - she would like to continue, feels that her mood is calmer with this   Skin lesion- Derm referral placed    BMI Counseling  Body mass index is 30.66 kg/m².

## 2023-05-26 ENCOUNTER — OFFICE VISIT (OUTPATIENT)
Dept: OBSTETRICS AND GYNECOLOGY | Facility: CLINIC | Age: 48
End: 2023-05-26
Payer: MEDICAID

## 2023-05-26 VITALS
DIASTOLIC BLOOD PRESSURE: 79 MMHG | HEIGHT: 60 IN | WEIGHT: 157 LBS | SYSTOLIC BLOOD PRESSURE: 114 MMHG | BODY MASS INDEX: 30.82 KG/M2

## 2023-05-26 DIAGNOSIS — Z01.419 WELL WOMAN EXAM WITH ROUTINE GYNECOLOGICAL EXAM: Primary | ICD-10-CM

## 2023-05-26 DIAGNOSIS — L98.9 SKIN LESION: ICD-10-CM

## 2023-05-26 RX ORDER — DEXTROAMPHETAMINE/AMPHETAMINE 15 MG
CAPSULE, EXT RELEASE 24 HR ORAL
COMMUNITY
Start: 2023-05-22

## 2023-05-26 RX ORDER — NORETHINDRONE ACETATE AND ETHINYL ESTRADIOL, ETHINYL ESTRADIOL AND FERROUS FUMARATE 1MG-10(24)
1 KIT ORAL DAILY
Qty: 84 TABLET | Refills: 4 | Status: SHIPPED | OUTPATIENT
Start: 2023-05-26

## 2023-06-05 ENCOUNTER — TELEPHONE (OUTPATIENT)
Dept: OBSTETRICS AND GYNECOLOGY | Facility: CLINIC | Age: 48
End: 2023-06-05
Payer: MEDICAID

## 2023-06-05 NOTE — TELEPHONE ENCOUNTER
Referral/recs faxed to  in Dermatology for scheduling with Dr. Morrissey.   They will call pt to schedule.  Or pt may call them at [phone]

## 2023-06-05 NOTE — TELEPHONE ENCOUNTER
----- Message from Pamela Belcher MD sent at 5/26/2023 11:02 AM EDT -----  Duplicate (temitope) - she would like to see DR. Maya for Derm. Thanks!

## 2023-06-06 LAB
CYTOLOGIST CVX/VAG CYTO: NORMAL
CYTOLOGY CVX/VAG DOC CYTO: NORMAL
CYTOLOGY CVX/VAG DOC THIN PREP: NORMAL
DX ICD CODE: NORMAL
HIV 1 & 2 AB SER-IMP: NORMAL
HPV I/H RISK 4 DNA CVX QL PROBE+SIG AMP: NEGATIVE
Lab: NORMAL
OTHER STN SPEC: NORMAL
STAT OF ADQ CVX/VAG CYTO-IMP: NORMAL

## 2023-06-13 DIAGNOSIS — K51.00 ULCERATIVE PANCOLITIS WITHOUT COMPLICATION: Primary | ICD-10-CM

## 2023-06-14 RX ORDER — MESALAMINE 1.2 G/1
TABLET, DELAYED RELEASE ORAL
Qty: 360 TABLET | Refills: 3 | Status: SHIPPED | OUTPATIENT
Start: 2023-06-14

## 2023-07-10 ENCOUNTER — APPOINTMENT (OUTPATIENT)
Dept: WOMENS IMAGING | Facility: HOSPITAL | Age: 48
End: 2023-07-10
Payer: MEDICAID

## 2023-07-10 PROCEDURE — 77067 SCR MAMMO BI INCL CAD: CPT | Performed by: RADIOLOGY

## 2023-07-10 PROCEDURE — 77063 BREAST TOMOSYNTHESIS BI: CPT | Performed by: RADIOLOGY

## 2023-07-22 DIAGNOSIS — K51.00 ULCERATIVE PANCOLITIS WITHOUT COMPLICATION: ICD-10-CM

## 2023-07-24 RX ORDER — MESALAMINE 1.2 G/1
4.8 TABLET, DELAYED RELEASE ORAL DAILY
Qty: 360 TABLET | Refills: 3 | OUTPATIENT
Start: 2023-07-24

## 2023-07-28 NOTE — TELEPHONE ENCOUNTER
It sounds like her insurance company is denying the Lialda (mesalamine) that she has been on for her ulcerative colitis.       Please call her and find out if the Lialda helped her diarrhea or other symptoms of ulcerative colitis. If it helped we could try another generic mesalamine pill. If she says that Lialda helpled then we could try prescribing a generic mesalamine pill: 4 po daily. If she is OK with that then send in such a prescription and we will see how she does?       Also, I don't see that she has a f/u appointment with me in the office. Please schedule her with me or one of the NP/PA's in the next few mos. [email or web address]

## 2023-07-31 NOTE — TELEPHONE ENCOUNTER
Called pt and pt reports that she is not having any problems getting her medication and she has plenty of it.  Advised of Dr aCpellan's note.  Pt made f/u for 12/04 at 9a with Ana PANIAGUA.

## 2023-09-07 ENCOUNTER — TELEPHONE (OUTPATIENT)
Dept: GASTROENTEROLOGY | Facility: CLINIC | Age: 48
End: 2023-09-07
Payer: MEDICAID

## 2023-09-07 NOTE — TELEPHONE ENCOUNTER
"Prior authorization has been approved.    \"PA Case: [id number], Status: Approved, Coverage Starts on: 9/7/2023 12:00:00 AM, Coverage Ends on: 9/6/2024 12:00:00 AM.\"      [id number] message sent  "

## 2023-09-15 ENCOUNTER — TELEPHONE (OUTPATIENT)
Dept: GASTROENTEROLOGY | Facility: CLINIC | Age: 48
End: 2023-09-15
Payer: MEDICAID

## 2023-09-15 DIAGNOSIS — K51.00 ULCERATIVE PANCOLITIS WITHOUT COMPLICATION: ICD-10-CM

## 2023-09-15 RX ORDER — MESALAMINE 1.2 G/1
4.8 TABLET, DELAYED RELEASE ORAL DAILY
Qty: 360 TABLET | Refills: 3 | Status: SHIPPED | OUTPATIENT
Start: 2023-09-15 | End: 2023-09-18 | Stop reason: SDUPTHER

## 2023-09-15 NOTE — TELEPHONE ENCOUNTER
Called pt and on vm per HIPAA advised that we have refilled her lialda 01.2gm take 4 pills po daily #360 with 3 refills to her [facility]. Advised to call with questions.

## 2023-09-18 DIAGNOSIS — K51.00 ULCERATIVE PANCOLITIS WITHOUT COMPLICATION: ICD-10-CM

## 2023-09-18 RX ORDER — MESALAMINE 1.2 G/1
4.8 TABLET, DELAYED RELEASE ORAL DAILY
Qty: 360 TABLET | Refills: 3 | Status: SHIPPED | OUTPATIENT
Start: 2023-09-18

## 2023-09-18 NOTE — TELEPHONE ENCOUNTER
Called pt and pt needs to have her lialda 1.2gm script sent to [facility] pharmacy due to insurance change. Advised pt we will resend this ot her [facility].  Verb understanding.

## 2023-09-18 NOTE — TELEPHONE ENCOUNTER
\    Caller: Marah Britt    Relationship: Self    Best call back number:[phone]    Requested Prescriptions:   Requested Prescriptions     Pending Prescriptions Disp Refills    mesalamine (LIALDA) 1.2 g EC tablet 360 tablet 3     Sig: Take 4 tablets by mouth Daily.        Pharmacy where request should be sent:  [facility] [address] [address] [phone]    Last office visit with prescribing clinician: 1/5/2023   Last telemedicine visit with prescribing clinician: Visit date not found   Next office visit with prescribing clinician: Visit date not found     Additional details provided by patient:     Does the patient have less than a 3 day supply:  [x] Yes  [] No    Would you like a call back once the refill request has been completed: [x] Yes [] No    If the office needs to give you a call back, can they leave a voicemail: [x] Yes [] No    Chetan Moses Rep   09/18/23 10:05 EDT

## 2023-11-07 ENCOUNTER — TELEPHONE (OUTPATIENT)
Dept: OBSTETRICS AND GYNECOLOGY | Facility: CLINIC | Age: 48
End: 2023-11-07
Payer: MEDICAID

## 2023-11-07 NOTE — TELEPHONE ENCOUNTER
Pt states she has new insurance and is now needing PA for Penny Turner. Looks like we haven't received any PA request faxed, but pt says they informed her PA could be done by our office by going to the [employer] website or calling.     Does she need to have them just fax over a PA request first?    Thanks!

## 2023-11-08 NOTE — TELEPHONE ENCOUNTER
I just looked on cover my meds and the only birth control that needs a PA with her insurance is Loestrin Fe 1/20, Norethindrone/Ethinyl Estradiol Is Not Required which is what was prescribed to her.

## 2023-11-17 ENCOUNTER — TELEPHONE (OUTPATIENT)
Dept: OBSTETRICS AND GYNECOLOGY | Facility: CLINIC | Age: 48
End: 2023-11-17
Payer: MEDICAID

## 2023-11-17 NOTE — TELEPHONE ENCOUNTER
felicia Juan and her insurance are calling again regarding Penny Turner.     They provided # and fax   Phone# [phone]  Fax- [phone]

## 2023-11-20 ENCOUNTER — TELEPHONE (OUTPATIENT)
Dept: OBSTETRICS AND GYNECOLOGY | Facility: CLINIC | Age: 48
End: 2023-11-20
Payer: MEDICAID

## 2023-11-20 NOTE — TELEPHONE ENCOUNTER
no it is for the BC. She did say her insurance has changed, she says it is now [employer]. Could this be the reason she is needing PA? I spoke with her insurance who said she needed PA, but pt seemed unaware. I told her I'd see if you have her updated insurace

## 2023-11-20 NOTE — TELEPHONE ENCOUNTER
In general, birth control pills do not cause an excessive amount of weight gain, so if it is a significant amount (more than a few pounds), she should investigate other causes. I am happy to discuss more if she would like to make an appointment.

## 2023-11-20 NOTE — TELEPHONE ENCOUNTER
Pt currently taking Lo Loestrin Fe, but says she feels it may be causing her to gain a lot of weight recently. She states she takes because BC helps with her moods, but after discussing with friends getting on a hormone was suggested. Pt is considering stopping BC due to weight gain and is requesting provider's opinion    Please advise,   Thank you

## 2023-11-20 NOTE — TELEPHONE ENCOUNTER
PA is not required for the birth control that was sent into the pharmacy, is she requesting a different prescription be sent in??

## 2023-12-04 ENCOUNTER — OFFICE VISIT (OUTPATIENT)
Dept: GASTROENTEROLOGY | Facility: CLINIC | Age: 48
End: 2023-12-04
Payer: MEDICAID

## 2023-12-04 VITALS
WEIGHT: 161.5 LBS | HEART RATE: 87 BPM | HEIGHT: 60 IN | DIASTOLIC BLOOD PRESSURE: 85 MMHG | TEMPERATURE: 97.3 F | BODY MASS INDEX: 31.71 KG/M2 | SYSTOLIC BLOOD PRESSURE: 123 MMHG | OXYGEN SATURATION: 98 %

## 2023-12-04 DIAGNOSIS — K51.00 ULCERATIVE PANCOLITIS WITHOUT COMPLICATION: Primary | ICD-10-CM

## 2023-12-04 DIAGNOSIS — Z83.719 FH: COLON POLYPS: ICD-10-CM

## 2023-12-04 RX ORDER — MESALAMINE 1.2 G/1
4.8 TABLET, DELAYED RELEASE ORAL DAILY
Qty: 360 TABLET | Refills: 3 | Status: SHIPPED | OUTPATIENT
Start: 2023-12-04

## 2023-12-04 RX ORDER — MESALAMINE 1000 MG/1
1000 SUPPOSITORY RECTAL NIGHTLY
COMMUNITY

## 2023-12-04 NOTE — PROGRESS NOTES
"Chief Complaint  Ulcerative pancolitis without complication    Subjective          History of Present Illness    Marah Britt is a  48 y.o. female presents for follow-up on ulcerative pancolitis diagnosed in 2009.  She is a patient of Dr. Capellan last seen on 3/21/2023 for colonoscopy.  She is new to me.    Takes mesalamine 3.6 g daily.  Takes Canasa suppositories 1000 mg rectally at night as needed for flares.  She reports ebbs and flows in her symptoms.  Seems to have recurrent mild flares.  Recent colonoscopy with mild distal inflammation.    9/7/2023 normal CMP, CBC, vitamin D 55    1/8/23 H. pylori negative.    3/21/2023 colonoscopy showed mild focal active colitis between 20 and 30 cm from the anus and mild chronic active colitis in the rectum.  Hyperplastic polyp.  Recall 2 years.      Objective   Vital Signs:   /85   Pulse 87   Temp 97.3 °F (36.3 °C)   Ht 152.4 cm (60\")   Wt 73.3 kg (161 lb 8 oz)   SpO2 98%   BMI 31.54 kg/m²       Physical Exam  Vitals reviewed.   Constitutional:       General: She is awake. She is not in acute distress.     Appearance: Normal appearance. She is well-developed and well-groomed.   HENT:      Head: Normocephalic.   Pulmonary:      Effort: Pulmonary effort is normal. No respiratory distress.   Skin:     Coloration: Skin is not pale.   Neurological:      Mental Status: She is alert and oriented to person, place, and time.      Gait: Gait is intact.   Psychiatric:         Mood and Affect: Mood and affect normal.         Speech: Speech normal.         Behavior: Behavior is cooperative.         Judgment: Judgment normal.          Result Review :             Assessment and Plan    Diagnoses and all orders for this visit:    1. Ulcerative pancolitis without complication (Primary)  -     mesalamine (LIALDA) 1.2 g EC tablet; Take 4 tablets by mouth Daily.  Dispense: 360 tablet; Refill: 3    2. FH: colon polyps    Increase Lialda to 4/day, restart Canasa suppositories and " take for 30 days consistently.    He has concerns over her ongoing symptoms and increased risk of colon cancer.  She could consider yearly colonoscopy if desired, she will let me know.     Follow Up   Return in about 6 months (around 6/4/2024).    Dragon dictation used throughout this note.     Ana Yepez PA-C

## 2024-07-15 ENCOUNTER — APPOINTMENT (OUTPATIENT)
Dept: WOMENS IMAGING | Facility: HOSPITAL | Age: 49
End: 2024-07-15
Payer: MEDICAID

## 2024-07-15 PROCEDURE — 77067 SCR MAMMO BI INCL CAD: CPT | Performed by: RADIOLOGY

## 2024-07-15 PROCEDURE — 77063 BREAST TOMOSYNTHESIS BI: CPT | Performed by: RADIOLOGY

## 2024-09-19 ENCOUNTER — TELEPHONE (OUTPATIENT)
Dept: GASTROENTEROLOGY | Facility: CLINIC | Age: 49
End: 2024-09-19
Payer: MEDICAID

## 2024-09-19 DIAGNOSIS — K51.00 ULCERATIVE PANCOLITIS WITHOUT COMPLICATION: ICD-10-CM

## 2024-09-19 RX ORDER — MESALAMINE 1.2 G/1
4.8 TABLET, DELAYED RELEASE ORAL DAILY
Qty: 120 TABLET | Refills: 0 | Status: SHIPPED | OUTPATIENT
Start: 2024-09-19

## 2024-10-02 ENCOUNTER — OFFICE VISIT (OUTPATIENT)
Dept: GASTROENTEROLOGY | Facility: CLINIC | Age: 49
End: 2024-10-02
Payer: MEDICAID

## 2024-10-02 VITALS
BODY MASS INDEX: 34.07 KG/M2 | HEART RATE: 79 BPM | HEIGHT: 55 IN | WEIGHT: 147.2 LBS | TEMPERATURE: 96.9 F | SYSTOLIC BLOOD PRESSURE: 122 MMHG | DIASTOLIC BLOOD PRESSURE: 82 MMHG

## 2024-10-02 DIAGNOSIS — K51.00 ULCERATIVE PANCOLITIS WITHOUT COMPLICATION: ICD-10-CM

## 2024-10-02 DIAGNOSIS — Z83.719 FH: COLON POLYPS: ICD-10-CM

## 2024-10-02 DIAGNOSIS — T14.8XXA BRUISING: Primary | ICD-10-CM

## 2024-10-02 NOTE — PROGRESS NOTES
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 She last had a colonoscopy on 3/21/2023. Takes mesalamine (4 pills/day) 3.6 g daily.  Takes Canasa suppositories 1000 mg rectally at night as needed for flares. She may take frequent AM BM's. She takes citrucel once/day.        No abd or chest pain. NO diarrhea. NO constipation. No rectal bleeding or mucous in stool . No nausea or vomiting. Once/yr she gets big bruises in abd and upper thighs. Nonsmoker. No ETOH. Weight is up. No fevers, chills, night sweats.             Past Medical History:   Diagnosis Date    Abnormal Pap smear of cervix ,     Acne     ADHD     Ulcerative colitis        Past Surgical History:   Procedure Laterality Date    AUGMENTATION MAMMAPLASTY      BREAST AUGMENTATION       SECTION      COLONOSCOPY  2015    left sided ulcerative colitis    COLONOSCOPY  2011    Probable ulcerative proctitis? with some inflammation around the appendiceal orifice    COLONOSCOPY N/A 10/22/2018    Normal    COLONOSCOPY N/A 3/15/2021    Procedure: COLONOSCOPY TO CECUM WITH BIOPSIES;  Surgeon: David Capellan MD;  Location: [facility] ENDOSCOPY;  Service: Gastroenterology;  Laterality: N/A;  PRE- HISTORY ULCERATIVE COLITIS  POST- NORMAL    COLONOSCOPY N/A 3/21/2023    Procedure: COLONOSCOPY to cecum into TI with cold biopsies/polypectomies;  Surgeon: David Capellan MD;  Location: [facility] ENDOSCOPY;  Service: Gastroenterology;  Laterality: N/A;  pre- history ulcerative colitis  post- polyps, proctitis    UPPER GASTROINTESTINAL ENDOSCOPY  2015    gastritis    WISDOM TOOTH EXTRACTION           Current Outpatient Medications:     Adderall XR 15 MG 24 hr capsule, 25 mg, Disp: , Rfl:     BIOTIN PO, Take 1 tablet by mouth Daily., Disp: , Rfl:     cetirizine (zyrTEC) 10 MG tablet, TAKE 1 TABLET BY MOUTH  EVERY DAY AS NEEDED FOR ALLERGIES OR RUNNY NOSE, Disp: , Rfl:     fluticasone (FLONASE) 50 MCG/ACT nasal spray, 1 spray by Each Nare route As Needed. Shake before using., Disp: , Rfl:     mesalamine (Canasa) 1000 MG suppository, Insert 1 suppository into the rectum Every Night., Disp: , Rfl:     mesalamine (LIALDA) 1.2 g EC tablet, Take 4 tablets by mouth Daily., Disp: 120 tablet, Rfl: 0    montelukast (SINGULAIR) 10 MG tablet, Take 1 tablet by mouth Every Morning., Disp: , Rfl:     spironolactone (ALDACTONE) 25 MG tablet, TK 1 TO 2 TS PO D, Disp: , Rfl: 0    tretinoin (RETIN-A) 0.05 % cream, , Disp: , Rfl:     VITAMIN D PO, Take 25 mg by mouth Daily., Disp: , Rfl:     No Known Allergies    Family History   Problem Relation Age of Onset    Pulmonary embolism Father     Melanoma Sister     Prostate cancer Maternal Uncle     Pancreatic cancer Maternal Grandmother     Melanoma Maternal Grandfather     Breast cancer Neg Hx     Ovarian cancer Neg Hx     Uterine cancer Neg Hx     Colon cancer Neg Hx     Deep vein thrombosis Neg Hx     Malig Hyperthermia Neg Hx        Social History     Socioeconomic History    Marital status:    Tobacco Use    Smoking status: Former     Current packs/day: 0.00     Types: Cigarettes     Quit date:      Years since quittin.7    Smokeless tobacco: Never    Tobacco comments:     smoked some in twenties.   Vaping Use    Vaping status: Never Used   Substance and Sexual Activity    Alcohol use: Yes     Comment: rare/socail     Drug use: No    Sexual activity: Not Currently     Birth control/protection: OCP       Review of Systems   Gastrointestinal:  Negative for abdominal pain and constipation.   All other systems reviewed and are negative.    Pertinent positives and negatives documented in the HPI and all other systems reviewed and were found to be negative.  Vitals:    10/02/24 1559   BP: 122/82   Pulse: 79   Temp: 96.9 °F (36.1 °C)       Physical Exam  Vitals reviewed.    Constitutional:       General: She is not in acute distress.     Appearance: Normal appearance. She is well-developed. She is not diaphoretic.   HENT:      Head: Normocephalic and atraumatic. Hair is normal.      Right Ear: Hearing, tympanic membrane, ear canal and external ear normal. No decreased hearing noted. No drainage.      Left Ear: Hearing, tympanic membrane, ear canal and external ear normal. No decreased hearing noted.      Nose: Nose normal. No nasal deformity.      Mouth/Throat:      Mouth: Mucous membranes are moist.   Eyes:      General: Lids are normal.         Right eye: No discharge.         Left eye: No discharge.      Extraocular Movements: Extraocular movements intact.      Conjunctiva/sclera: Conjunctivae normal.      Pupils: Pupils are equal, round, and reactive to light.   Neck:      Thyroid: No thyromegaly.      Vascular: No JVD.      Trachea: No tracheal deviation.   Cardiovascular:      Rate and Rhythm: Normal rate and regular rhythm.      Pulses: Normal pulses.      Heart sounds: Normal heart sounds. No murmur heard.     No friction rub. No gallop.   Pulmonary:      Effort: Pulmonary effort is normal. No respiratory distress.      Breath sounds: Normal breath sounds. No wheezing or rales.   Chest:      Chest wall: No tenderness.   Abdominal:      General: Bowel sounds are normal. There is no distension.      Palpations: Abdomen is soft. There is no mass.      Tenderness: There is no abdominal tenderness. There is no guarding or rebound.      Hernia: No hernia is present.   Genitourinary:     Rectum: Normal. Guaiac result negative.   Musculoskeletal:         General: No tenderness or deformity. Normal range of motion.      Cervical back: Normal range of motion and neck supple.   Lymphadenopathy:      Cervical: No cervical adenopathy.   Skin:     General: Skin is warm and dry.      Findings: No erythema or rash.   Neurological:      Mental Status: She is alert and oriented to person,  place, and time.      Cranial Nerves: No cranial nerve deficit.      Motor: No abnormal muscle tone.      Coordination: Coordination normal.      Deep Tendon Reflexes: Reflexes are normal and symmetric. Reflexes normal.   Psychiatric:         Mood and Affect: Mood normal.         Behavior: Behavior normal.         Thought Content: Thought content normal.         Judgment: Judgment normal.         Diagnoses and all orders for this visit:    1. Bruising (Primary)  -     CBC & Differential  -     Comprehensive Metabolic Panel  -     Protime-INR    2. Ulcerative pancolitis without complication  -     CBC & Differential  -     Comprehensive Metabolic Panel  -     Protime-INR    3. FH: colon polyps  -     CBC & Differential  -     Comprehensive Metabolic Panel  -     Protime-INR      Assessment:  ulcerative pancolitis diagnosed in 2009.  Intermittent bruising in thighs and lower abdomen.      Recommendations:  Labs: CMP, CBC, PT  Take Canasa suppositories every other night.  Continue Mesalamine  She does not want a CT abd/pelvis  She should have a colonoscopy in 3/25.   F/u with Dr. SAROJ Julien in 1/25.     Return in about 13 weeks (around 1/1/2025).    David Capellan MD  10/2/2024

## 2024-10-09 ENCOUNTER — LAB (OUTPATIENT)
Dept: LAB | Facility: HOSPITAL | Age: 49
End: 2024-10-09
Payer: MEDICAID

## 2024-10-09 ENCOUNTER — TELEPHONE (OUTPATIENT)
Dept: GASTROENTEROLOGY | Facility: CLINIC | Age: 49
End: 2024-10-09
Payer: MEDICAID

## 2024-10-09 DIAGNOSIS — R10.13 EPIGASTRIC PAIN: Primary | ICD-10-CM

## 2024-10-09 LAB
ALBUMIN SERPL-MCNC: 4.1 G/DL (ref 3.5–5.2)
ALBUMIN/GLOB SERPL: 1.5 G/DL
ALP SERPL-CCNC: 73 U/L (ref 39–117)
ALT SERPL W P-5'-P-CCNC: 12 U/L (ref 1–33)
ANION GAP SERPL CALCULATED.3IONS-SCNC: 11 MMOL/L (ref 5–15)
AST SERPL-CCNC: 12 U/L (ref 1–32)
BASOPHILS # BLD AUTO: 0.03 10*3/MM3 (ref 0–0.2)
BASOPHILS NFR BLD AUTO: 0.3 % (ref 0–1.5)
BILIRUB SERPL-MCNC: 0.5 MG/DL (ref 0–1.2)
BUN SERPL-MCNC: 17 MG/DL (ref 6–20)
BUN/CREAT SERPL: 21 (ref 7–25)
CALCIUM SPEC-SCNC: 9.4 MG/DL (ref 8.6–10.5)
CHLORIDE SERPL-SCNC: 103 MMOL/L (ref 98–107)
CO2 SERPL-SCNC: 25 MMOL/L (ref 22–29)
CREAT SERPL-MCNC: 0.81 MG/DL (ref 0.57–1)
DEPRECATED RDW RBC AUTO: 39.3 FL (ref 37–54)
EGFRCR SERPLBLD CKD-EPI 2021: 89.1 ML/MIN/1.73
EOSINOPHIL # BLD AUTO: 0.03 10*3/MM3 (ref 0–0.4)
EOSINOPHIL NFR BLD AUTO: 0.3 % (ref 0.3–6.2)
ERYTHROCYTE [DISTWIDTH] IN BLOOD BY AUTOMATED COUNT: 11.7 % (ref 12.3–15.4)
GLOBULIN UR ELPH-MCNC: 2.7 GM/DL
GLUCOSE SERPL-MCNC: 88 MG/DL (ref 65–99)
HCT VFR BLD AUTO: 38.8 % (ref 34–46.6)
HGB BLD-MCNC: 12.6 G/DL (ref 12–15.9)
IMM GRANULOCYTES # BLD AUTO: 0.04 10*3/MM3 (ref 0–0.05)
IMM GRANULOCYTES NFR BLD AUTO: 0.4 % (ref 0–0.5)
INR PPP: 1.01 (ref 0.9–1.1)
LYMPHOCYTES # BLD AUTO: 3.17 10*3/MM3 (ref 0.7–3.1)
LYMPHOCYTES NFR BLD AUTO: 29.5 % (ref 19.6–45.3)
MCH RBC QN AUTO: 29.9 PG (ref 26.6–33)
MCHC RBC AUTO-ENTMCNC: 32.5 G/DL (ref 31.5–35.7)
MCV RBC AUTO: 91.9 FL (ref 79–97)
MONOCYTES # BLD AUTO: 0.74 10*3/MM3 (ref 0.1–0.9)
MONOCYTES NFR BLD AUTO: 6.9 % (ref 5–12)
NEUTROPHILS NFR BLD AUTO: 6.73 10*3/MM3 (ref 1.7–7)
NEUTROPHILS NFR BLD AUTO: 62.6 % (ref 42.7–76)
NRBC BLD AUTO-RTO: 0 /100 WBC (ref 0–0.2)
PLATELET # BLD AUTO: 317 10*3/MM3 (ref 140–450)
PMV BLD AUTO: 9.2 FL (ref 6–12)
POTASSIUM SERPL-SCNC: 4.2 MMOL/L (ref 3.5–5.2)
PROT SERPL-MCNC: 6.8 G/DL (ref 6–8.5)
PROTHROMBIN TIME: 13.5 SECONDS (ref 11.7–14.2)
RBC # BLD AUTO: 4.22 10*6/MM3 (ref 3.77–5.28)
SODIUM SERPL-SCNC: 139 MMOL/L (ref 136–145)
WBC NRBC COR # BLD AUTO: 10.74 10*3/MM3 (ref 3.4–10.8)

## 2024-10-09 PROCEDURE — 85025 COMPLETE CBC W/AUTO DIFF WBC: CPT | Performed by: INTERNAL MEDICINE

## 2024-10-09 PROCEDURE — 85610 PROTHROMBIN TIME: CPT | Performed by: INTERNAL MEDICINE

## 2024-10-09 PROCEDURE — 80053 COMPREHEN METABOLIC PANEL: CPT | Performed by: INTERNAL MEDICINE

## 2024-10-09 NOTE — TELEPHONE ENCOUNTER
Called pt and advised that her labs orders are in the chart and she can get them done at [facility].  Verb understanding.     Pt is asking if she can get a h pylori breath test done due she was on a vacation and was drinking after someone that has since been diagnosed with h pylori. Advised will send message to Dr Capellan.

## 2024-10-09 NOTE — TELEPHONE ENCOUNTER
Hub staff attempted to follow warm transfer process and was unsuccessful     Caller: Marah Britt    Relationship to patient: Self    Best call back number: [phone]     Patient is needing: PT CALLED TO GET ORDER FOR BLOOD WORK SENT TO THE LAB AT THE HOSPITAL AND WANTED TO IF WE CAN ADD THE HPYLORI TEST BECAUSE OVER THE SUMMER PT DRANK AFTER PERSON WHO WAS DX'D WITH HYPORLI.

## 2024-10-10 ENCOUNTER — TELEPHONE (OUTPATIENT)
Dept: GASTROENTEROLOGY | Facility: CLINIC | Age: 49
End: 2024-10-10
Payer: MEDICAID

## 2024-10-10 NOTE — TELEPHONE ENCOUNTER
Tell her that getting an H. pylori breath test is more complicated than a blood draw.  She could get an H. pylori breath test but I do not believe they do them at the hospital.  We do them at our office but many other places do not do the H. pylori breath test.  If she wants to have an H. pylori breath test she would need to make sure that she has been off of all proton pump inhibitors and oral antibiotics for at least 2 weeks.  If so then she could have a H. pylori breath test but it would have to be done at our office.  If she wants to have an H. pylori breath test done in our office then please order I will cosign.  Per Dr Capellan.

## 2024-10-10 NOTE — TELEPHONE ENCOUNTER
Called pt and passed on Dr Capellan's recommendations  Order placed for hpylori breat test, and lab apt scheduled.

## 2024-10-10 NOTE — TELEPHONE ENCOUNTER
----- Message from David Capellan sent at 10/9/2024  8:12 PM EDT -----  10/09/24       Tell her that her lab work looks good.  Please send a copy of this report to her PCP.  Fernandez agosto

## 2024-10-14 ENCOUNTER — LAB (OUTPATIENT)
Dept: GASTROENTEROLOGY | Facility: CLINIC | Age: 49
End: 2024-10-14
Payer: MEDICAID

## 2024-10-14 DIAGNOSIS — R10.13 EPIGASTRIC PAIN: ICD-10-CM

## 2024-10-15 LAB — UREA BREATH TEST QL: NEGATIVE

## 2024-10-17 ENCOUNTER — TELEPHONE (OUTPATIENT)
Dept: GASTROENTEROLOGY | Facility: CLINIC | Age: 49
End: 2024-10-17
Payer: MEDICAID

## 2024-10-17 NOTE — PROGRESS NOTES
10/17/24       Tell her that her Helicobacter pylori breath test is negative, which means that she does not have Helicobacter pylori lining her stomach at this time.  Please send a copy of this report to her PCP.  Fernandez agosto

## 2024-10-17 NOTE — TELEPHONE ENCOUNTER
Patient notified of results and recommendations and verbalized understanding  Results routed to the PCP via epic

## 2024-10-25 DIAGNOSIS — K51.00 ULCERATIVE PANCOLITIS WITHOUT COMPLICATION: ICD-10-CM

## 2024-10-28 RX ORDER — MESALAMINE 1.2 G/1
4.8 TABLET, DELAYED RELEASE ORAL DAILY
Qty: 360 TABLET | Refills: 3 | Status: SHIPPED | OUTPATIENT
Start: 2024-10-28

## 2024-11-11 ENCOUNTER — TELEPHONE (OUTPATIENT)
Dept: GASTROENTEROLOGY | Facility: CLINIC | Age: 49
End: 2024-11-11

## 2024-11-11 ENCOUNTER — PREP FOR SURGERY (OUTPATIENT)
Dept: OTHER | Facility: HOSPITAL | Age: 49
End: 2024-11-11
Payer: MEDICAID

## 2024-11-11 DIAGNOSIS — Z87.19 HISTORY OF ULCERATIVE COLITIS: ICD-10-CM

## 2024-11-11 DIAGNOSIS — Z12.11 ENCOUNTER FOR SCREENING FOR MALIGNANT NEOPLASM OF COLON: Primary | ICD-10-CM

## 2024-11-11 NOTE — TELEPHONE ENCOUNTER
Caller: Marah Britt    Relationship to patient: Self    Best call back number: [phone]    REQUEST: SCHEDULE C-SCOPE    Type of visit: PROCEDURE    Requested date: MARCH, 2025       Additional notes:    PT SAW DR. HOLLOWAY ON 10/02.  HE TOLD HER TO CALL IN NOV. TO SCHEDULE A COLONOSCOPY WITH DR. BORIS HERNANDEZ FOR MARCH.  SHE IS AN ESTABLISHED PATIENT TO THE PRACTICE, BUT NEW TO DR. HERNANDEZ.   PLEASE CALL BACK TO ADVISE IF SHE NEEDS AN APPT 1ST (AS SHE JUST SAW DR. HOLLOWAY IN OCT) OR WILL THE PROCEDURE JUST NEED TO BE SCHEDULED.

## 2024-11-12 ENCOUNTER — TELEPHONE (OUTPATIENT)
Dept: GASTROENTEROLOGY | Facility: CLINIC | Age: 49
End: 2024-11-12

## 2024-11-12 ENCOUNTER — TELEPHONE (OUTPATIENT)
Dept: GASTROENTEROLOGY | Facility: CLINIC | Age: 49
End: 2024-11-12
Payer: MEDICAID

## 2024-11-12 NOTE — TELEPHONE ENCOUNTER
Hub staff attempted to follow warm transfer process and was unsuccessful     Caller: Marah Britt    Relationship to patient: Self    Best call back number:[phone]    Patient is needing: PATIENT RETURNING CALL TO SCHEDULE COLONOSCOPY. PLEASE CALL PATIENT

## 2024-11-13 ENCOUNTER — TELEPHONE (OUTPATIENT)
Dept: GASTROENTEROLOGY | Facility: CLINIC | Age: 49
End: 2024-11-13
Payer: MEDICAID

## 2024-11-13 PROBLEM — Z87.19 HISTORY OF ULCERATIVE COLITIS: Status: ACTIVE | Noted: 2024-11-11

## 2024-11-13 PROBLEM — Z12.11 ENCOUNTER FOR SCREENING FOR MALIGNANT NEOPLASM OF COLON: Status: ACTIVE | Noted: 2024-11-11

## 2025-01-14 ENCOUNTER — HOSPITAL ENCOUNTER (OUTPATIENT)
Facility: SURGERY CENTER | Age: 50
Setting detail: HOSPITAL OUTPATIENT SURGERY
Discharge: HOME | End: 2025-01-14
Attending: INTERNAL MEDICINE | Admitting: INTERNAL MEDICINE
Payer: MEDICAID

## 2025-01-14 ENCOUNTER — ANESTHESIA EVENT (OUTPATIENT)
Dept: SURGERY | Facility: SURGERY CENTER | Age: 50
End: 2025-01-14
Payer: MEDICAID

## 2025-01-14 ENCOUNTER — ANESTHESIA (OUTPATIENT)
Dept: SURGERY | Facility: SURGERY CENTER | Age: 50
End: 2025-01-14
Payer: MEDICAID

## 2025-01-14 VITALS
WEIGHT: 147 LBS | TEMPERATURE: 98.1 F | BODY MASS INDEX: 29.64 KG/M2 | HEART RATE: 79 BPM | SYSTOLIC BLOOD PRESSURE: 105 MMHG | RESPIRATION RATE: 16 BRPM | OXYGEN SATURATION: 100 % | HEIGHT: 59 IN | DIASTOLIC BLOOD PRESSURE: 81 MMHG

## 2025-01-14 DIAGNOSIS — Z87.19 HISTORY OF ULCERATIVE COLITIS: ICD-10-CM

## 2025-01-14 DIAGNOSIS — Z12.11 ENCOUNTER FOR SCREENING FOR MALIGNANT NEOPLASM OF COLON: ICD-10-CM

## 2025-01-14 LAB
B-HCG UR QL: NEGATIVE
EXPIRATION DATE: NORMAL
INTERNAL NEGATIVE CONTROL: NORMAL
INTERNAL POSITIVE CONTROL: NORMAL
Lab: NORMAL

## 2025-01-14 PROCEDURE — 81025 URINE PREGNANCY TEST: CPT | Performed by: INTERNAL MEDICINE

## 2025-01-14 PROCEDURE — S0260 H&P FOR SURGERY: HCPCS | Performed by: INTERNAL MEDICINE

## 2025-01-14 PROCEDURE — 25810000003 LACTATED RINGERS PER 1000 ML: Performed by: INTERNAL MEDICINE

## 2025-01-14 PROCEDURE — 25010000002 PROPOFOL 1000 MG/100ML EMULSION: Performed by: STUDENT IN AN ORGANIZED HEALTH CARE EDUCATION/TRAINING PROGRAM

## 2025-01-14 PROCEDURE — 88305 TISSUE EXAM BY PATHOLOGIST: CPT | Performed by: INTERNAL MEDICINE

## 2025-01-14 PROCEDURE — 25810000003 LACTATED RINGERS PER 1000 ML: Performed by: STUDENT IN AN ORGANIZED HEALTH CARE EDUCATION/TRAINING PROGRAM

## 2025-01-14 PROCEDURE — 45380 COLONOSCOPY AND BIOPSY: CPT | Performed by: INTERNAL MEDICINE

## 2025-01-14 PROCEDURE — 25010000002 LIDOCAINE 1 % SOLUTION: Performed by: STUDENT IN AN ORGANIZED HEALTH CARE EDUCATION/TRAINING PROGRAM

## 2025-01-14 PROCEDURE — 25010000002 PROPOFOL 10 MG/ML EMULSION: Performed by: STUDENT IN AN ORGANIZED HEALTH CARE EDUCATION/TRAINING PROGRAM

## 2025-01-14 PROCEDURE — 25010000002 GLYCOPYRROLATE 1 MG/5ML SOLUTION: Performed by: STUDENT IN AN ORGANIZED HEALTH CARE EDUCATION/TRAINING PROGRAM

## 2025-01-14 RX ORDER — GLYCOPYRROLATE 0.2 MG/ML
INJECTION INTRAMUSCULAR; INTRAVENOUS AS NEEDED
Status: DISCONTINUED | OUTPATIENT
Start: 2025-01-14 | End: 2025-01-14 | Stop reason: SURG

## 2025-01-14 RX ORDER — LIDOCAINE HYDROCHLORIDE 10 MG/ML
INJECTION, SOLUTION INFILTRATION; PERINEURAL AS NEEDED
Status: DISCONTINUED | OUTPATIENT
Start: 2025-01-14 | End: 2025-01-14 | Stop reason: SURG

## 2025-01-14 RX ORDER — SODIUM CHLORIDE 0.9 % (FLUSH) 0.9 %
10 SYRINGE (ML) INJECTION AS NEEDED
Status: DISCONTINUED | OUTPATIENT
Start: 2025-01-14 | End: 2025-01-14 | Stop reason: HOSPADM

## 2025-01-14 RX ORDER — SODIUM CHLORIDE, SODIUM LACTATE, POTASSIUM CHLORIDE, CALCIUM CHLORIDE 600; 310; 30; 20 MG/100ML; MG/100ML; MG/100ML; MG/100ML
30 INJECTION, SOLUTION INTRAVENOUS CONTINUOUS
Status: DISCONTINUED | OUTPATIENT
Start: 2025-01-14 | End: 2025-01-14 | Stop reason: HOSPADM

## 2025-01-14 RX ORDER — SODIUM CHLORIDE, SODIUM LACTATE, POTASSIUM CHLORIDE, CALCIUM CHLORIDE 600; 310; 30; 20 MG/100ML; MG/100ML; MG/100ML; MG/100ML
INJECTION, SOLUTION INTRAVENOUS CONTINUOUS PRN
Status: DISCONTINUED | OUTPATIENT
Start: 2025-01-14 | End: 2025-01-14 | Stop reason: SURG

## 2025-01-14 RX ORDER — PROPOFOL 10 MG/ML
INJECTION, EMULSION INTRAVENOUS AS NEEDED
Status: DISCONTINUED | OUTPATIENT
Start: 2025-01-14 | End: 2025-01-14 | Stop reason: SURG

## 2025-01-14 RX ORDER — LIDOCAINE HYDROCHLORIDE 10 MG/ML
0.5 INJECTION, SOLUTION INFILTRATION; PERINEURAL ONCE AS NEEDED
Status: DISCONTINUED | OUTPATIENT
Start: 2025-01-14 | End: 2025-01-14 | Stop reason: HOSPADM

## 2025-01-14 RX ADMIN — SODIUM CHLORIDE, POTASSIUM CHLORIDE, SODIUM LACTATE AND CALCIUM CHLORIDE 30 ML/HR: 600; 310; 30; 20 INJECTION, SOLUTION INTRAVENOUS at 12:06

## 2025-01-14 RX ADMIN — LIDOCAINE HYDROCHLORIDE 30 MG: 10 INJECTION, SOLUTION INFILTRATION; PERINEURAL at 12:52

## 2025-01-14 RX ADMIN — PROPOFOL 160 MCG/KG/MIN: 10 INJECTION, EMULSION INTRAVENOUS at 12:52

## 2025-01-14 RX ADMIN — SODIUM CHLORIDE, POTASSIUM CHLORIDE, SODIUM LACTATE AND CALCIUM CHLORIDE: 600; 310; 30; 20 INJECTION, SOLUTION INTRAVENOUS at 12:52

## 2025-01-14 RX ADMIN — GLYCOPYRROLATE 0.2 MG: 0.2 INJECTION, SOLUTION INTRAMUSCULAR; INTRAVENOUS at 12:52

## 2025-01-14 RX ADMIN — PROPOFOL 60 MG: 10 INJECTION, EMULSION INTRAVENOUS at 12:52

## 2025-01-14 NOTE — PERIOPERATIVE NURSING NOTE
1140 on admission pt complainign of sore right eye. Staterd she took a nap with her contacts in.. right eye appears re and pt says its sore.

## 2025-01-14 NOTE — ANESTHESIA PREPROCEDURE EVALUATION
Anesthesia Evaluation     Patient summary reviewed and Nursing notes reviewed   NPO Solid Status: > 8 hours  NPO Liquid Status: > 2 hours           Airway   Mallampati: II  TM distance: >3 FB  Neck ROM: full  Dental      Pulmonary    (+) a smoker Former,  Cardiovascular - negative cardio ROS        Neuro/Psych  (+) psychiatric history ADHD  GI/Hepatic/Renal/Endo      ROS Comment: Ulcerative colitis    Musculoskeletal (-) negative ROS    Abdominal    Substance History      OB/GYN          Other - negative ROS                   Anesthesia Plan    ASA 2     MAC     intravenous induction     Anesthetic plan, risks, benefits, and alternatives have been provided, discussed and informed consent has been obtained with: patient.    CODE STATUS:           used

## 2025-01-14 NOTE — H&P
[facility]  Pre Procedure History & Physical    Chief Complaint:   Screening-h/o UC    Subjective     HPI:   50 yo here today for colonoscopy.  Pt reports no FH CRC/polyps.  Patient denies GI symptoms currently.  Last exam - h/o UC dx  on mesalamine    Past Medical History:   Past Medical History:   Diagnosis Date    Abnormal Pap smear of cervix ,     Acne     ADHD     Ulcerative colitis        Past Surgical History:  Past Surgical History:   Procedure Laterality Date    AUGMENTATION MAMMAPLASTY      BREAST AUGMENTATION       SECTION      COLONOSCOPY  2015    left sided ulcerative colitis    COLONOSCOPY  2011    Probable ulcerative proctitis? with some inflammation around the appendiceal orifice    COLONOSCOPY N/A 10/22/2018    Normal    COLONOSCOPY N/A 3/15/2021    Procedure: COLONOSCOPY TO CECUM WITH BIOPSIES;  Surgeon: David Capellan MD;  Location: [facility] ENDOSCOPY;  Service: Gastroenterology;  Laterality: N/A;  PRE- HISTORY ULCERATIVE COLITIS  POST- NORMAL    COLONOSCOPY N/A 3/21/2023    Procedure: COLONOSCOPY to cecum into TI with cold biopsies/polypectomies;  Surgeon: David Capellan MD;  Location: [facility] ENDOSCOPY;  Service: Gastroenterology;  Laterality: N/A;  pre- history ulcerative colitis  post- polyps, proctitis    UPPER GASTROINTESTINAL ENDOSCOPY  2015    gastritis    WISDOM TOOTH EXTRACTION         Family History:  Family History   Problem Relation Age of Onset    Pulmonary embolism Father     Melanoma Sister     Prostate cancer Maternal Uncle     Pancreatic cancer Maternal Grandmother     Melanoma Maternal Grandfather     Breast cancer Neg Hx     Ovarian cancer Neg Hx     Uterine cancer Neg Hx     Colon cancer Neg Hx     Deep vein thrombosis Neg Hx     Malig Hyperthermia Neg Hx        Social History:   reports that she quit smoking about 19 years ago. Her smoking use included cigarettes. She has never used smokeless tobacco. She reports  "current alcohol use. She reports that she does not use drugs.    Medications:   Medications Prior to Admission   Medication Sig Dispense Refill Last Dose/Taking    Adderall XR 15 MG 24 hr capsule 25 mg   1/13/2025    ASHWAGANDHA PO Take 1 tablet by mouth Daily.   1/13/2025    BIOTIN PO Take 1 tablet by mouth Daily.   1/13/2025    fluticasone (FLONASE) 50 MCG/ACT nasal spray 1 spray by Each Nare route As Needed. Shake before using.   1/13/2025    mesalamine (Lialda) 1.2 g EC tablet TAKE 4 TABLETS BY MOUTH DAILY 360 tablet 3 1/13/2025    montelukast (SINGULAIR) 10 MG tablet Take 1 tablet by mouth Every Morning.   1/13/2025    spironolactone (ALDACTONE) 25 MG tablet TK 1 TO 2 TS PO D  0 1/13/2025    VITAMIN D PO Take 25 mg by mouth Daily.   1/13/2025    cetirizine (zyrTEC) 10 MG tablet TAKE 1 TABLET BY MOUTH EVERY DAY AS NEEDED FOR ALLERGIES OR RUNNY NOSE   More than a month    mesalamine (Canasa) 1000 MG suppository Insert 1 suppository into the rectum Every Night.   More than a month    tretinoin (RETIN-A) 0.05 % cream    More than a month       Allergies:  Patient has no known allergies.    ROS:    Pertinent items are noted in HPI     Objective     Blood pressure 115/75, pulse 98, temperature 97.3 °F (36.3 °C), temperature source Temporal, resp. rate 16, height 149.9 cm (59\"), weight 66.7 kg (147 lb), SpO2 98%, not currently breastfeeding.    Physical Exam   Constitutional: Pt is oriented to person, place, and time and well-developed, well-nourished, and in no distress.   Mouth/Throat: Oropharynx is clear and moist.   Neck: Normal range of motion.   Cardiovascular: Normal rate, regular rhythm    Pulmonary/Chest: Effort normal    Abdominal: Soft. Nontender  Skin: Skin is warm and dry.   Psychiatric: Mood, memory, affect and judgment normal.     Assessment & Plan     Diagnosis:  Screening-h/o UC    Anticipated Surgical Procedure:  colonoscopy    The risks, benefits, and alternatives of this procedure have been " discussed with the patient or the responsible party- the patient understands and agrees to proceed.

## 2025-01-14 NOTE — ANESTHESIA POSTPROCEDURE EVALUATION
Patient: Marah Britt    Procedure Summary       Date: 01/14/25 Room / Location: [facility] OR 05 / [facility]    Anesthesia Start: 1249 Anesthesia Stop: 1312    Procedure: COLONOSCOPY TO CECUM, BIOPSIES Diagnosis:       Encounter for screening for malignant neoplasm of colon      History of ulcerative colitis      (Encounter for screening for malignant neoplasm of colon [Z12.11])      (History of ulcerative colitis [Z87.19])    Surgeons: Mamta Julien MD Provider: Du Correia MD    Anesthesia Type: MAC ASA Status: 2            Anesthesia Type: MAC    Vitals  Vitals Value Taken Time   BP 93/69 01/14/25 1314   Temp 36.7 °C (98.1 °F) 01/14/25 1311   Pulse 98 01/14/25 1313   Resp 16 01/14/25 1311   SpO2 99 % 01/14/25 1313   Vitals shown include unfiled device data.        Post Anesthesia Care and Evaluation    Patient location during evaluation: bedside  Level of consciousness: awake and alert  Pain management: adequate    Airway patency: patent  Anesthetic complications: No anesthetic complications  PONV Status: controlled  Cardiovascular status: blood pressure returned to baseline and acceptable  Respiratory status: acceptable  Hydration status: acceptable

## 2025-01-28 ENCOUNTER — TELEPHONE (OUTPATIENT)
Dept: GASTROENTEROLOGY | Facility: CLINIC | Age: 50
End: 2025-01-28
Payer: MEDICAID

## 2025-01-28 NOTE — TELEPHONE ENCOUNTER
Called pt and advised of Dr Julien's note. Verb understanding.     C/s placed in recall and hm for 01/14/2027.

## 2025-01-28 NOTE — TELEPHONE ENCOUNTER
----- Message from Mamta Julien sent at 1/24/2025  5:39 PM EST -----  No active inflammation seen on colon biopsies.  Continue mesalamine  The polyp(s) showed hyperplastic change, which is non-cancerous and not pre-cancerous. Follow-up colonoscopy in 2 years is advised.

## 2025-07-21 ENCOUNTER — APPOINTMENT (OUTPATIENT)
Dept: WOMENS IMAGING | Facility: HOSPITAL | Age: 50
End: 2025-07-21
Payer: MEDICAID

## 2025-07-21 PROCEDURE — 77063 BREAST TOMOSYNTHESIS BI: CPT | Performed by: RADIOLOGY

## 2025-07-21 PROCEDURE — 77067 SCR MAMMO BI INCL CAD: CPT | Performed by: RADIOLOGY

## (undated) DEVICE — ADAPT CLN BIOGUARD AIR/H2O DISP

## (undated) DEVICE — TUBING, SUCTION, 1/4" X 10', STRAIGHT: Brand: MEDLINE

## (undated) DEVICE — SENSR O2 OXIMAX FNGR A/ 18IN NONSTR

## (undated) DEVICE — KT ORCA ORCAPOD DISP STRL

## (undated) DEVICE — VIAL FORMLN CAP 10PCT 20ML

## (undated) DEVICE — Device

## (undated) DEVICE — THE TORRENT IRRIGATION SCOPE CONNECTOR IS USED WITH THE TORRENT IRRIGATION TUBING TO PROVIDE IRRIGATION FLUIDS SUCH AS STERILE WATER DURING GASTROINTESTINAL ENDOSCOPIC PROCEDURES WHEN USED IN CONJUNCTION WITH AN IRRIGATION PUMP (OR ELECTROSURGICAL UNIT).: Brand: TORRENT

## (undated) DEVICE — LN SMPL CO2 SHTRM SD STREAM W/M LUER

## (undated) DEVICE — CANN NASL CO2 TRULINK W/O2 A/

## (undated) DEVICE — ADAPT CLN SCPE ENDO PORPOISE BX/50 DISP

## (undated) DEVICE — SINGLE-USE BIOPSY FORCEPS: Brand: RADIAL JAW 4

## (undated) DEVICE — CANN O2 ETCO2 FITS ALL CONN CO2 SMPL A/ 7IN DISP LF

## (undated) DEVICE — Device: Brand: DEFENDO AIR/WATER/SUCTION AND BIOPSY VALVE

## (undated) DEVICE — GOWN ISOL W/THUMB UNIV BLU BX/15

## (undated) DEVICE — GAUZE,SPONGE,FLUFF,6"X6.75",STRL,5/TRAY: Brand: MEDLINE

## (undated) DEVICE — FLEX ADVANTAGE 1500CC: Brand: FLEX ADVANTAGE